# Patient Record
Sex: MALE | Race: WHITE | NOT HISPANIC OR LATINO | Employment: STUDENT | RURAL
[De-identification: names, ages, dates, MRNs, and addresses within clinical notes are randomized per-mention and may not be internally consistent; named-entity substitution may affect disease eponyms.]

---
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
## 2020-01-21 DIAGNOSIS — F80.2 RECEPTIVE EXPRESSIVE LANGUAGE DISORDER: ICD-10-CM

## 2020-01-21 DIAGNOSIS — F31.9 BIPOLAR AND RELATED DISORDER (HCC): ICD-10-CM

## 2020-01-21 DIAGNOSIS — F84.0 AUTISM SPECTRUM DISORDER: ICD-10-CM

## 2020-01-21 DIAGNOSIS — F51.05 INSOMNIA DUE TO MENTAL DISORDER: ICD-10-CM

## 2020-01-21 RX ORDER — QUETIAPINE FUMARATE 100 MG/1
100 TABLET, FILM COATED ORAL 3 TIMES DAILY
Qty: 90 TABLET | Refills: 2 | Status: SHIPPED | OUTPATIENT
Start: 2020-01-21 | End: 2020-03-24 | Stop reason: SDUPTHER

## 2020-01-21 RX ORDER — QUETIAPINE FUMARATE 300 MG/1
300 TABLET, FILM COATED ORAL NIGHTLY
Qty: 30 TABLET | Refills: 2 | Status: SHIPPED | OUTPATIENT
Start: 2020-01-21 | End: 2020-03-24 | Stop reason: SDUPTHER

## 2020-01-21 RX ORDER — CLONIDINE HYDROCHLORIDE 0.2 MG/1
TABLET ORAL
Qty: 60 TABLET | Refills: 2 | Status: SHIPPED | OUTPATIENT
Start: 2020-01-21 | End: 2020-03-24 | Stop reason: SDUPTHER

## 2020-01-21 RX ORDER — BENZTROPINE MESYLATE 1 MG/1
0.5 TABLET ORAL 2 TIMES DAILY
Qty: 30 TABLET | Refills: 2 | Status: SHIPPED | OUTPATIENT
Start: 2020-01-21 | End: 2020-03-24 | Stop reason: SDUPTHER

## 2020-03-24 ENCOUNTER — TELEMEDICINE (OUTPATIENT)
Dept: PSYCHIATRY | Facility: CLINIC | Age: 20
End: 2020-03-24

## 2020-03-24 VITALS
HEART RATE: 119 BPM | TEMPERATURE: 99.3 F | SYSTOLIC BLOOD PRESSURE: 133 MMHG | BODY MASS INDEX: 24.16 KG/M2 | WEIGHT: 145 LBS | OXYGEN SATURATION: 97 % | DIASTOLIC BLOOD PRESSURE: 91 MMHG | HEIGHT: 65 IN

## 2020-03-24 DIAGNOSIS — F31.9 BIPOLAR AND RELATED DISORDER (HCC): ICD-10-CM

## 2020-03-24 DIAGNOSIS — F51.05 INSOMNIA DUE TO MENTAL DISORDER: ICD-10-CM

## 2020-03-24 DIAGNOSIS — F80.2 RECEPTIVE EXPRESSIVE LANGUAGE DISORDER: ICD-10-CM

## 2020-03-24 DIAGNOSIS — F84.0 AUTISM SPECTRUM DISORDER: Primary | ICD-10-CM

## 2020-03-24 DIAGNOSIS — F41.9 ANXIETY DISORDER, UNSPECIFIED TYPE: ICD-10-CM

## 2020-03-24 PROCEDURE — 90792 PSYCH DIAG EVAL W/MED SRVCS: CPT | Performed by: NURSE PRACTITIONER

## 2020-03-24 RX ORDER — QUETIAPINE FUMARATE 300 MG/1
300 TABLET, FILM COATED ORAL NIGHTLY
Qty: 30 TABLET | Refills: 1 | Status: SHIPPED | OUTPATIENT
Start: 2020-03-24 | End: 2020-04-28 | Stop reason: SDUPTHER

## 2020-03-24 RX ORDER — QUETIAPINE FUMARATE 100 MG/1
TABLET, FILM COATED ORAL
Qty: 90 TABLET | Refills: 1 | Status: SHIPPED | OUTPATIENT
Start: 2020-03-24 | End: 2020-04-28 | Stop reason: SDUPTHER

## 2020-03-24 RX ORDER — HYDROXYZINE HYDROCHLORIDE 25 MG/1
25 TABLET, FILM COATED ORAL
Qty: 30 TABLET | Refills: 1 | Status: SHIPPED | OUTPATIENT
Start: 2020-03-24 | End: 2020-04-28 | Stop reason: SDUPTHER

## 2020-03-24 RX ORDER — CLONIDINE HYDROCHLORIDE 0.2 MG/1
TABLET ORAL
Qty: 60 TABLET | Refills: 1 | Status: SHIPPED | OUTPATIENT
Start: 2020-03-24 | End: 2020-04-28 | Stop reason: SDUPTHER

## 2020-03-24 RX ORDER — BENZTROPINE MESYLATE 1 MG/1
0.5 TABLET ORAL 2 TIMES DAILY
Qty: 30 TABLET | Refills: 1 | Status: SHIPPED | OUTPATIENT
Start: 2020-03-24 | End: 2020-04-28 | Stop reason: SDUPTHER

## 2020-03-24 NOTE — PROGRESS NOTES
"This provider is located at [facility], [address] [facility], [place], [address] using [employer].  The patient is seen remotely at [facility], [address], [place] [address] via [employer], an encrypted service provided through [facility] with staff present.  The patient's condition being diagnosed/treated is appropriate for telemedicine. The provider identified herself as well as her credentials.  The patient and/or patient's guardian consent to be seen remotely, and when consent is given they understand that the consent allows for patient identifiable information to be sent to a third party as needed.   They may refuse to be seen remotely at any time. The electronic data is encrypted and password protected, and the patient has been advised of the potential risks to privacy notwithstanding such measures.      Char Butler is a 19 y.o. male who presents today for medication management and initial encounter with this APRN    Chief Complaint:  Bipolar disorder, autism, impulse-control disorder, disruptive behavior, mixed receptive expressive language disorder    History of Present Illness:  Accompanied by: The patient was being seen with a therapist (Kary) and a nurse (Cindy) due to the patient's age, condition, and to assist with sharing of information and facilitation of visit.  The patient has known language disorder and is unable to answer any questions.    This is the first encounter for this APRN with this patient.  The patient does repeat words and sounds during the visit.  The patient's nurse and therapist report that the patient has been very hyperactive, they state that this is his normal.  The therapist reports that they recently found out that the patient was \"cheeking\" some of his medications.  Therapist reports that they now have to watch him swallow his medications to make sure that he does not cheek them, she states that they do not feel this was intentional " or happened more than a few times.  The therapist nurse reports that the patient has had some behavioral outbursts, they state it is hard to get him to sit still as he likes to be very active.  The patient's nurse and therapist report that the patient has been more physically aggressive and that he has been grabbing people.  They do not think that he has been doing this in order to hurt anybody, they state that he would just grab them and not let go.  They report that he likes to give hugs.  They report the patient's appetite is good, although they report that it takes him a long time to eat, especially at supper.  They report that at suppertime he will sit for an hour and the staff have to either feed him, or encourage him to take every bite by telling him to take a bite each time, and that does seem to help.  They report that the patient has trouble falling asleep as well as staying asleep, they estimate that he averages about 3 hours of sleep per night and would like to consider adding something to his medications to help with sleep.  They deny any symptoms or signs of nightmares.  They deny the patient acting depressed, but he does appear anxious at times they state.  He does appear to be very hyperactive throughout most of the day they state.  The therapist and nurse deny any demonstrations of auditory or visual hallucinations.  The nurse and therapist deny any expressions of or signs of suicidal or homicidal ideations.  The therapist and nurse deny any abnormal muscle movements, tics, or side effects from his current medication regimen.  The therapist and nurse denied any new medical problems or changes in medications with the patient since his last visit with his facility.  They deny any fever, recent illness, recent travel outside the country, or any known exposure to the [place] at 19 virus.  The nurse and therapist would like to add something at night to help him sleep to the patient's medications at this  visit.        The following portions of the patient's history were reviewed and updated as appropriate: allergies, current medications, past family history, past medical history, past social history, past surgical history and problem list.      Past Medical History:  Past Medical History:   Diagnosis Date   • Autism spectrum disorder    • Bipolar disorder (CMS/HCC)    • Disruptive behavior disorder    • Impulse control disorder    • Mixed receptive-expressive language disorder    • Psychosis (CMS/HCC)        Social History:  Social History     Socioeconomic History   • Marital status: Single     Spouse name: Not on file   • Number of children: Not on file   • Years of education: Not on file   • Highest education level: Not on file   Tobacco Use   • Smoking status: Unknown If Ever Smoked   Substance and Sexual Activity   • Alcohol use: Not Currently   • Drug use: Not Currently   • Sexual activity: Defer       Family History:  Family History   Family history unknown: Yes       Past Surgical History:  History reviewed. No pertinent surgical history.    Problem List:  There is no problem list on file for this patient.      Allergy:   Allergies   Allergen Reactions   • Cinnamon Unknown (See Comments)     Taken from referral   • Fish-Derived Products Unknown (See Comments)     Taken from referral   • Nutmeg Oil (Myristica Oil) Unknown (See Comments)     Taken from referral   • Tomato Unknown - High Severity     Raw tomatoes        Current Medications:   Current Outpatient Medications   Medication Sig Dispense Refill   • benztropine (COGENTIN) 1 MG tablet Take 0.5 tablets by mouth 2 (Two) Times a Day. 30 tablet 1   • cloNIDine (CATAPRES) 0.2 MG tablet Take 0.5 tablets by mouth Daily With Breakfast & Lunch AND 1 tablet Every Night. 7AM, 3PM, 7PM 60 tablet 1   • hydrOXYzine (ATARAX) 25 MG tablet Take 1 tablet by mouth every night at bedtime. 30 tablet 1   • QUEtiapine (SEROquel) 100 MG tablet Take one pill three times daily  "at 7 am, 12 pm, and 4 pm 90 tablet 1   • QUEtiapine (SEROquel) 300 MG tablet Take 1 tablet by mouth Every Night. 30 tablet 1     No current facility-administered medications for this visit.        Review of Symptoms:    Review of Systems   Constitutional: Negative.    HENT: Negative.    Eyes: Negative.    Respiratory: Negative.    Cardiovascular: Negative.    Gastrointestinal: Negative.    Endocrine: Negative.    Genitourinary: Negative.    Musculoskeletal: Negative.    Skin: Negative.    Neurological: Negative.    Psychiatric/Behavioral: Positive for agitation, behavioral problems, decreased concentration, sleep disturbance, positive for hyperactivity and stress. Negative for dysphoric mood, hallucinations, self-injury, suicidal ideas and depressed mood. The patient is nervous/anxious.          Physical Exam:   Physical Exam   Constitutional: He appears well-nourished.   Neurological: He is alert.   Psychiatric: He is hyperactive. Cognition and memory are impaired. He expresses no homicidal and no suicidal ideation. He is noncommunicative.   Vitals reviewed.    Blood pressure 133/91, pulse 119, temperature 99.3 °F (37.4 °C), height 165.1 cm (65\"), weight 65.8 kg (145 lb), SpO2 97 %. Body mass index is 24.13 kg/m².      Previous Provider notes and available records reviewed by this APRN today.      Mental Status Exam:   Hygiene:   good  Cooperation:  Guarded  Eye Contact:  Poor  Psychomotor Behavior:  Restless  Affect:  Restricted  Mood: anxious  Hopelessness: Denies  Speech:  minimal to mute, he can repeat sounds and some words, no reported change from patient's baseline  Thought Process:  Unable to demonstrate  Thought Content:  Unable to demonstrate  Suicidal:  None demonstrated  Homicidal:  None demonstrated  Hallucinations:  Not demonstrated today   Delusion:  Unable to demonstrate   Orientation:  Unable to evaluate and he will look at you and respond with sounds when you say his name  Reliability:  " poor  Insight:  None  Judgement:  Impaired  Impulse Control:  Impaired  Physical/Medical Issues:  Chronic health issues, no acute physical or medical issues today       Lab Results:   No visits with results within 1 Month(s) from this visit.   Latest known visit with results is:   No results found for any previous visit.       Assessment/Plan   Problems Addressed this Visit     None      Visit Diagnoses     Autism spectrum disorder    -  Primary    Relevant Medications    benztropine (COGENTIN) 1 MG tablet    cloNIDine (CATAPRES) 0.2 MG tablet    QUEtiapine (SEROquel) 100 MG tablet    QUEtiapine (SEROquel) 300 MG tablet    hydrOXYzine (ATARAX) 25 MG tablet    Bipolar and related disorder (CMS/HCC)        Relevant Medications    benztropine (COGENTIN) 1 MG tablet    cloNIDine (CATAPRES) 0.2 MG tablet    QUEtiapine (SEROquel) 100 MG tablet    QUEtiapine (SEROquel) 300 MG tablet    hydrOXYzine (ATARAX) 25 MG tablet    Anxiety disorder, unspecified type        Relevant Medications    benztropine (COGENTIN) 1 MG tablet    cloNIDine (CATAPRES) 0.2 MG tablet    QUEtiapine (SEROquel) 100 MG tablet    QUEtiapine (SEROquel) 300 MG tablet    hydrOXYzine (ATARAX) 25 MG tablet    Insomnia due to mental disorder        Relevant Medications    benztropine (COGENTIN) 1 MG tablet    cloNIDine (CATAPRES) 0.2 MG tablet    QUEtiapine (SEROquel) 100 MG tablet    QUEtiapine (SEROquel) 300 MG tablet    hydrOXYzine (ATARAX) 25 MG tablet    Receptive expressive language disorder        Relevant Medications    benztropine (COGENTIN) 1 MG tablet    cloNIDine (CATAPRES) 0.2 MG tablet    QUEtiapine (SEROquel) 100 MG tablet    QUEtiapine (SEROquel) 300 MG tablet    hydrOXYzine (ATARAX) 25 MG tablet          Visit Diagnoses:    ICD-10-CM ICD-9-CM   1. Autism spectrum disorder F84.0 299.00   2. Bipolar and related disorder (CMS/HCC) F31.9 296.80   3. Anxiety disorder, unspecified type F41.9 300.00   4. Insomnia due to mental disorder F51.05 300.9      327.02   5. Receptive expressive language disorder F80.2 315.32         GOALS:  Short Term Goals: Patient will be compliant with medication, and patient will have no significant medication related side effects.  Patient will be engaged in psychotherapy as indicated.  Patient will report subjective improvement of symptoms.  Long term goals: To stabilize mood and treat/improve subjective symptoms, the patient will stay out of the hospital, the patient will be at an optimal level of functioning, and the patient will take all medications as prescribed.  The patient's nurse and therapist/guardians verbalized understanding and agreement with goals that were mutually set.      TREATMENT PLAN: Continue supportive psychotherapy efforts and medications as indicated.  Continue clonidine 0.1 mg by mouth twice daily for behaviors, take once in the morning and once at noon.  Continue clonidine 0.2 mg to take by mouth, for behaviors and sleep, once daily at bedtime.  Continue previously prescribed Cogentin 1 mg to take half a tablet, or a total of 0.5 mg, by mouth twice daily.  Continue Seroquel 100 mg to take by mouth 3 times daily, to take at 7 AM, 12 PM, and 4 PM for mooed and behaviors.  Continue Seroquel 300 mg by mouth once daily at bedtime for mood, behaviors, and sleep.  Add hydroxyzine 25 mg by mouth once daily at bedtime for insomnia/sleep and anxiety..  Pharmacological and Non-Pharmacological treatment options discussed during today's visit, including off label use of medications. Patient's caretakers/guardians acknowledged and verbally consented with current treatment plan and was educated on the importance of compliance with treatment and follow-up appointments.      Nursing staff to continue to monitor vital signs and will have patient see PCP if abnormal vital signs persist beyond 24 hours, change, worsen, or any concerns.      MEDICATION ISSUES:  Discussed medication options and treatment plan of prescribed  medication, any off label use of medication, as well as the risks, benefits, any black box warnings including increased suicidality, and side effects including but not limited to potential falls, dizziness, possible impaired driving, GI side effects (change in appetite, abdominal discomfort, nausea, vomiting, diarrhea, and/or constipation), dry mouth, somnolence, sedation, insomnia, activation, agitation, irritation, tremors, abnormal muscle movements or disorders, tardive dyskinesia, akathisia, asthenia, headache, sweating, possible bruising or rare bleeding, electrolyte and/or fluid abnormalities, change in blood pressure/heart rate/and or heart rhythm, hypotension, sexual dysfunction, rare impulse control problems, rare seizures, rare neuroleptic malignant syndrome, increased risk of death and cerebrovascular events, change in blood glucose and increased risk for diabetes, change in triglycerides and cholesterol and increased risk for dyslipidemia,  weight gain, weight gain that can become problematic to health, skin conditions and reactions, and metabolic adversities among others. Patient's caretakers/guardians are agreeable to call the office with any worsening of symptoms or onset of side effects, or if any concerns or questions arise.  The contact information for the office is made available to the patient's guardians/caretakers. Patient's caretakers/guardians are agreeable to call 911 or go to the nearest ER should they begin having any SI/HI, or if any urgent concerns arise. No medication side effects or related complaints today.       MEDS ORDERED DURING VISIT:  New Medications Ordered This Visit   Medications   • benztropine (COGENTIN) 1 MG tablet     Sig: Take 0.5 tablets by mouth 2 (Two) Times a Day.     Dispense:  30 tablet     Refill:  1   • cloNIDine (CATAPRES) 0.2 MG tablet     Sig: Take 0.5 tablets by mouth Daily With Breakfast & Lunch AND 1 tablet Every Night. 7AM, 3PM, 7PM     Dispense:  60  tablet     Refill:  1   • QUEtiapine (SEROquel) 100 MG tablet     Sig: Take one pill three times daily at 7 am, 12 pm, and 4 pm     Dispense:  90 tablet     Refill:  1   • QUEtiapine (SEROquel) 300 MG tablet     Sig: Take 1 tablet by mouth Every Night.     Dispense:  30 tablet     Refill:  1   • hydrOXYzine (ATARAX) 25 MG tablet     Sig: Take 1 tablet by mouth every night at bedtime.     Dispense:  30 tablet     Refill:  1       Return in about 4 weeks (around 4/21/2020), or if symptoms worsen or fail to improve, for Recheck.       Progress toward goal: Not at goal    Functional Status: Severe impairment    Prognosis: Guarded with Ongoing Treatment            This document has been electronically signed by JOSUE Agosto  March 24, 2020 18:46    Please note that portions of this note were completed with a voice recognition program. Efforts were made to edit dictation, but occasionally words are mistranscribed.

## 2020-04-28 ENCOUNTER — TELEMEDICINE (OUTPATIENT)
Dept: PSYCHIATRY | Facility: CLINIC | Age: 20
End: 2020-04-28

## 2020-04-28 VITALS
DIASTOLIC BLOOD PRESSURE: 69 MMHG | HEART RATE: 104 BPM | WEIGHT: 150 LBS | TEMPERATURE: 97.5 F | BODY MASS INDEX: 24.99 KG/M2 | OXYGEN SATURATION: 98 % | SYSTOLIC BLOOD PRESSURE: 101 MMHG | HEIGHT: 65 IN

## 2020-04-28 DIAGNOSIS — F84.0 AUTISM SPECTRUM DISORDER: Primary | ICD-10-CM

## 2020-04-28 DIAGNOSIS — F51.05 INSOMNIA DUE TO MENTAL DISORDER: ICD-10-CM

## 2020-04-28 DIAGNOSIS — F80.2 RECEPTIVE EXPRESSIVE LANGUAGE DISORDER: ICD-10-CM

## 2020-04-28 DIAGNOSIS — F41.9 ANXIETY DISORDER, UNSPECIFIED TYPE: ICD-10-CM

## 2020-04-28 DIAGNOSIS — F39 UNSPECIFIED MOOD (AFFECTIVE) DISORDER (HCC): ICD-10-CM

## 2020-04-28 PROCEDURE — 99214 OFFICE O/P EST MOD 30 MIN: CPT | Performed by: NURSE PRACTITIONER

## 2020-04-28 RX ORDER — BENZTROPINE MESYLATE 1 MG/1
0.5 TABLET ORAL 2 TIMES DAILY
Qty: 30 TABLET | Refills: 1 | Status: SHIPPED | OUTPATIENT
Start: 2020-04-28 | End: 2020-05-26 | Stop reason: SDUPTHER

## 2020-04-28 RX ORDER — QUETIAPINE FUMARATE 300 MG/1
300 TABLET, FILM COATED ORAL NIGHTLY
Qty: 30 TABLET | Refills: 1 | Status: SHIPPED | OUTPATIENT
Start: 2020-04-28 | End: 2020-05-26 | Stop reason: SDUPTHER

## 2020-04-28 RX ORDER — CLONIDINE HYDROCHLORIDE 0.2 MG/1
TABLET ORAL
Qty: 60 TABLET | Refills: 1 | Status: SHIPPED | OUTPATIENT
Start: 2020-04-28 | End: 2020-05-26 | Stop reason: SDUPTHER

## 2020-04-28 RX ORDER — QUETIAPINE FUMARATE 100 MG/1
TABLET, FILM COATED ORAL
Qty: 90 TABLET | Refills: 1 | Status: SHIPPED | OUTPATIENT
Start: 2020-04-28 | End: 2020-05-26 | Stop reason: SDUPTHER

## 2020-04-28 RX ORDER — HYDROXYZINE HYDROCHLORIDE 25 MG/1
25 TABLET, FILM COATED ORAL
Qty: 30 TABLET | Refills: 1 | Status: SHIPPED | OUTPATIENT
Start: 2020-04-28 | End: 2020-05-26 | Stop reason: SDUPTHER

## 2020-04-28 NOTE — TREATMENT PLAN
Multi-Disciplinary Problems (from Behavioral Health Treatment Plan)    Active Problems     Problem: Mood Instability  Start Date: 04/28/20    Problem Details:  The patient is unable to self-scale this problem on a 0-10 scale with 10 being the worst.       Goal Priority Start Date Expected End Date End Date    Patient will achieve mood stability as evidenced by controlled behavior and a more deliberate thought process -- 04/28/20 -- --    Goal Details:  Progress toward goal:  Not appropriate to rate progress toward goal since this is the initial treatment plan.       Goal Intervention Frequency Start Date End Date    Provide structure and focus to patient's thoughts and actions by establishing plans and routine. Weekly 04/28/20 --    Intervention Details:  Duration of treatment until until remission of symptoms.       Goal Intervention Frequency Start Date End Date    Assist patient in setting responsible goals and limits in behavior. Weekly 04/28/20 --    Intervention Details:  Duration of treatment until until remission of symptoms.       Prob Intervention Frequency Start Date End Date    Manage Emotion, Temperament and Mood PRN -- --                         I have discussed and reviewed this treatment plan with the patient and/or guardian.  The patient/guardian has verbally agreed with this treatment plan (no signatures are obtained at today's visit as the patient is a telehealth patient and is unable to print and sign this document, therefore verbal agreement is obtained).

## 2020-04-28 NOTE — PROGRESS NOTES
This provider is located at [facility], [address] [place] [address], [place], [address] using [employer].  The patient is seen remotely at [facility], [address], [place] [address] via [employer], an encrypted service provided through [facility] with staff present.  The patient's condition being diagnosed/treated is appropriate for telemedicine. The provider identified herself as well as her credentials.  The patient and/or patient's guardian consent to be seen remotely, and when consent is given they understand that the consent allows for patient identifiable information to be sent to a third party as needed.   They may refuse to be seen remotely at any time. The electronic data is encrypted and password protected, and the patient has been advised of the potential risks to privacy notwithstanding such measures.      Char Butler is a 19 y.o. male who presents today for follow up    Chief Complaint:  Bipolar disorder, autism, impulse-control disorder, disruptive behavior, mixed receptive expressive language disorder    History of Present Illness:  Accompanied by: The patient was being seen with a therapist (Kary) and a nurse (Cindy) due to the patient's age, condition, and to assist with sharing of information and facilitation of visit.  The patient has known language disorder and is unable to answer any questions.  The guardians/caretakers describe the patient's mood and behaviors as okay over the last few weeks.  His has not had as many behavioral outbursts.  The addition of hydroxyzine at bedtime at last visit seemed to help with his sleep, although they report that since his regular schedule and classroom setting has changed due to the covid 19 pandemic, he has gotten his days and nights turned around and tends to stay up late at night and sleep in during the day.  His sleep is improved though.  The guardians report the patient's appetite as good.  The guardians deny any  nightmares, or signs of nightmares, for the patient.  The guardians deny any new medical problems or changes in medications since last appointment with this facility.  The guardians report compliance with the patient's current medication regimen.  The guardians deny any current side effects from the patient's current medication regimen.  The guardians deny any noticeable abnormal muscle movements or tics.  The patient is unable to rate any symptoms of depression or anxiety on a 0-10 scale, with 10 being the worst.  The guardians report that the patient is currently not in a traditional classroom setting at their facility due to social distancing and quarantining recommendations from the Government with trying to decrease the spread/exposure to the Covid 19 virus pandemic.  The guardians would like to not adjust or change the patient's medications at this visit.  The patient and guardian deny any auditory hallucinations or visual hallucinations, or signs of auditory or visual hallucinations.  The patient and guardian deny any suicidal ideations and homicidal ideations, or signs of suicidal or homicidal ideations, and is convincing.      The following portions of the patient's history were reviewed and updated as appropriate: allergies, current medications, past family history, past medical history, past social history, past surgical history and problem list.      Past Medical History:  Past Medical History:   Diagnosis Date   • Autism spectrum disorder    • Bipolar disorder (CMS/HCC)    • Disruptive behavior disorder    • Impulse control disorder    • Mixed receptive-expressive language disorder    • Psychosis (CMS/HCC)        Social History:  Social History     Socioeconomic History   • Marital status: Single     Spouse name: Not on file   • Number of children: Not on file   • Years of education: Not on file   • Highest education level: Not on file   Tobacco Use   • Smoking status: Unknown If Ever Smoked   Substance  and Sexual Activity   • Alcohol use: Not Currently   • Drug use: Not Currently   • Sexual activity: Defer       Family History:  Family History   Family history unknown: Yes       Past Surgical History:  History reviewed. No pertinent surgical history.    Problem List:  There is no problem list on file for this patient.      Allergy:   Allergies   Allergen Reactions   • Cinnamon Unknown (See Comments)     Taken from referral   • Fish-Derived Products Unknown (See Comments)     Taken from referral   • Nutmeg Oil (Myristica Oil) Unknown (See Comments)     Taken from referral   • Tomato Unknown - High Severity     Raw tomatoes        Current Medications:   Current Outpatient Medications   Medication Sig Dispense Refill   • benztropine (COGENTIN) 1 MG tablet Take 0.5 tablets by mouth 2 (Two) Times a Day. 30 tablet 1   • cloNIDine (CATAPRES) 0.2 MG tablet Take 0.5 tablets by mouth Daily With Breakfast & Lunch AND 1 tablet Every Night. 60 tablet 1   • hydrOXYzine (ATARAX) 25 MG tablet Take 1 tablet by mouth every night at bedtime. 30 tablet 1   • QUEtiapine (SEROquel) 100 MG tablet Take one pill three times daily at 7 am, 12 pm, and 4 pm 90 tablet 1   • QUEtiapine (SEROquel) 300 MG tablet Take 1 tablet by mouth Every Night. 30 tablet 1     No current facility-administered medications for this visit.        Review of Symptoms:    Review of Systems   Constitutional: Negative.    HENT: Negative.    Eyes: Negative.    Respiratory: Negative.    Cardiovascular: Negative.    Gastrointestinal: Negative.    Endocrine: Negative.    Genitourinary: Negative.    Musculoskeletal: Negative.    Skin: Negative.    Neurological: Negative.    Psychiatric/Behavioral: Positive for agitation, behavioral problems, decreased concentration, sleep disturbance, positive for hyperactivity and stress. Negative for dysphoric mood, hallucinations, self-injury, suicidal ideas and depressed mood. The patient is nervous/anxious.          Physical Exam:  "  Physical Exam   Constitutional: He appears well-nourished.   Neurological: He is alert.   Psychiatric: He is hyperactive. Cognition and memory are impaired. He expresses no homicidal and no suicidal ideation. He is noncommunicative.   Vitals reviewed.        Blood pressure 101/69, pulse 104, temperature 97.5 °F (36.4 °C), height 165.1 cm (65\"), weight 68 kg (150 lb), SpO2 98 %. Body mass index is 24.96 kg/m².        Mental Status Exam:   Hygiene:   good  Cooperation:  Guarded  Eye Contact:  Poor  Psychomotor Behavior:  Restless  Affect:  Restricted  Mood: anxious  Hopelessness: Denies  Speech:  minimal, he can repeat sounds and some words, no reported change from patient's baseline  Thought Process:  Unable to demonstrate  Thought Content:  Unable to demonstrate  Suicidal:  None demonstrated  Homicidal:  None demonstrated  Hallucinations:  Not demonstrated today   Delusion:  Unable to demonstrate   Orientation:  Unable to evaluate and he will look at you and respond with sounds when you say his name  Reliability:  poor  Insight:  None  Judgement:  Impaired  Impulse Control:  Impaired  Physical/Medical Issues:  Chronic health issues, no acute physical or medical issues today       Lab Results:   No visits with results within 1 Month(s) from this visit.   Latest known visit with results is:   No results found for any previous visit.       Assessment/Plan   Problems Addressed this Visit     None      Visit Diagnoses     Autism spectrum disorder    -  Primary    Relevant Medications    benztropine (COGENTIN) 1 MG tablet    cloNIDine (CATAPRES) 0.2 MG tablet    hydrOXYzine (ATARAX) 25 MG tablet    QUEtiapine (SEROquel) 100 MG tablet    QUEtiapine (SEROquel) 300 MG tablet    Unspecified mood (affective) disorder (CMS/HCC)        Relevant Medications    benztropine (COGENTIN) 1 MG tablet    cloNIDine (CATAPRES) 0.2 MG tablet    hydrOXYzine (ATARAX) 25 MG tablet    QUEtiapine (SEROquel) 100 MG tablet    QUEtiapine " (SEROquel) 300 MG tablet    Anxiety disorder, unspecified type        Relevant Medications    benztropine (COGENTIN) 1 MG tablet    cloNIDine (CATAPRES) 0.2 MG tablet    hydrOXYzine (ATARAX) 25 MG tablet    QUEtiapine (SEROquel) 100 MG tablet    QUEtiapine (SEROquel) 300 MG tablet    Receptive expressive language disorder        Insomnia due to mental disorder        Relevant Medications    benztropine (COGENTIN) 1 MG tablet    cloNIDine (CATAPRES) 0.2 MG tablet    hydrOXYzine (ATARAX) 25 MG tablet    QUEtiapine (SEROquel) 100 MG tablet    QUEtiapine (SEROquel) 300 MG tablet          Visit Diagnoses:    ICD-10-CM ICD-9-CM   1. Autism spectrum disorder F84.0 299.00   2. Unspecified mood (affective) disorder (CMS/[facility]) F39 296.90   3. Anxiety disorder, unspecified type F41.9 300.00   4. Receptive expressive language disorder F80.2 315.32   5. Insomnia due to mental disorder F51.05 300.9     327.02         GOALS:  Short Term Goals: Patient will be compliant with medication, and patient will have no significant medication related side effects.  Patient will be engaged in psychotherapy as indicated.  Patient will report subjective improvement of symptoms.  Long term goals: To stabilize mood and treat/improve subjective symptoms, the patient will stay out of the hospital, the patient will be at an optimal level of functioning, and the patient will take all medications as prescribed.  The patient's nurse and therapist/guardians verbalized understanding and agreement with goals that were mutually set.      TREATMENT PLAN: Continue supportive psychotherapy efforts and medications as indicated.  Continue clonidine 0.1 mg by mouth twice daily for behaviors, take once in the morning and once at noon.  Continue clonidine 0.2 mg to take by mouth, for behaviors and sleep, once daily at bedtime.  Continue previously prescribed Cogentin 1 mg to take half a tablet, or a total of 0.5 mg, by mouth twice daily.  Continue Seroquel 100 mg  to take by mouth 3 times daily, to take at 7 AM, 12 PM, and 4 PM for mood and behaviors.  Continue Seroquel 300 mg by mouth once daily at bedtime for mood, behaviors, and sleep.  Continue hydroxyzine 25 mg by mouth once daily at bedtime for insomnia/sleep and anxiety.  Pharmacological and Non-Pharmacological treatment options discussed during today's visit, including off label use of medications. Patient's caretakers/guardians acknowledged and verbally consented with current treatment plan and was educated on the importance of compliance with treatment and follow-up appointments.        This office has requested the patient's most recent labs, if no recent labs for review we have discussed ordering labs as necessary.      MEDICATION ISSUES:  Discussed medication options and treatment plan of prescribed medication, any off label use of medication, as well as the risks, benefits, any black box warnings including increased suicidality, and side effects including but not limited to potential falls, dizziness, possible impaired driving, GI side effects (change in appetite, abdominal discomfort, nausea, vomiting, diarrhea, and/or constipation), dry mouth, somnolence, sedation, insomnia, activation, agitation, irritation, tremors, abnormal muscle movements or disorders, tardive dyskinesia, akathisia, asthenia, headache, sweating, possible bruising or rare bleeding, electrolyte and/or fluid abnormalities, change in blood pressure/heart rate/and or heart rhythm, hypotension, sexual dysfunction, rare impulse control problems, rare seizures, rare neuroleptic malignant syndrome, increased risk of death and cerebrovascular events, change in blood glucose and increased risk for diabetes, change in triglycerides and cholesterol and increased risk for dyslipidemia,  weight gain, weight gain that can become problematic to health, skin conditions and reactions, and metabolic adversities among others. Patient's caretakers/guardians  are agreeable to call the office with any worsening of symptoms or onset of side effects, or if any concerns or questions arise.  The contact information for the office is made available to the patient's guardians/caretakers. Patient's caretakers/guardians are agreeable to call 911 or go to the nearest ER should they begin having any SI/HI, or if any urgent concerns arise. No medication side effects or related complaints today.         MEDS ORDERED DURING VISIT:  New Medications Ordered This Visit   Medications   • benztropine (COGENTIN) 1 MG tablet     Sig: Take 0.5 tablets by mouth 2 (Two) Times a Day.     Dispense:  30 tablet     Refill:  1   • cloNIDine (CATAPRES) 0.2 MG tablet     Sig: Take 0.5 tablets by mouth Daily With Breakfast & Lunch AND 1 tablet Every Night.     Dispense:  60 tablet     Refill:  1   • hydrOXYzine (ATARAX) 25 MG tablet     Sig: Take 1 tablet by mouth every night at bedtime.     Dispense:  30 tablet     Refill:  1   • QUEtiapine (SEROquel) 100 MG tablet     Sig: Take one pill three times daily at 7 am, 12 pm, and 4 pm     Dispense:  90 tablet     Refill:  1   • QUEtiapine (SEROquel) 300 MG tablet     Sig: Take 1 tablet by mouth Every Night.     Dispense:  30 tablet     Refill:  1       Return in about 4 weeks (around 5/26/2020), or if symptoms worsen or fail to improve, for Recheck.       Progress toward goal: Not at goal    Functional Status: Severe impairment    Prognosis: Guarded with Ongoing Treatment     Treatment plan completed: 4/28/20.            This document has been electronically signed by JOSUE Agosto  April 28, 2020 12:34    Please note that portions of this note were completed with a voice recognition program. Efforts were made to edit dictation, but occasionally words are mistranscribed.

## 2020-05-26 ENCOUNTER — TELEMEDICINE (OUTPATIENT)
Dept: PSYCHIATRY | Facility: CLINIC | Age: 20
End: 2020-05-26

## 2020-05-26 VITALS — OXYGEN SATURATION: 96 % | BODY MASS INDEX: 24.83 KG/M2 | HEIGHT: 65 IN | WEIGHT: 149 LBS

## 2020-05-26 DIAGNOSIS — F39 UNSPECIFIED MOOD (AFFECTIVE) DISORDER (HCC): ICD-10-CM

## 2020-05-26 DIAGNOSIS — F51.05 INSOMNIA DUE TO MENTAL DISORDER: ICD-10-CM

## 2020-05-26 DIAGNOSIS — F41.9 ANXIETY DISORDER, UNSPECIFIED TYPE: ICD-10-CM

## 2020-05-26 DIAGNOSIS — F84.0 AUTISM SPECTRUM DISORDER: Primary | ICD-10-CM

## 2020-05-26 DIAGNOSIS — Z79.899 HIGH RISK MEDICATION USE: ICD-10-CM

## 2020-05-26 DIAGNOSIS — F80.2 RECEPTIVE EXPRESSIVE LANGUAGE DISORDER: ICD-10-CM

## 2020-05-26 PROCEDURE — 99214 OFFICE O/P EST MOD 30 MIN: CPT | Performed by: NURSE PRACTITIONER

## 2020-05-26 RX ORDER — CLONIDINE HYDROCHLORIDE 0.2 MG/1
TABLET ORAL
Qty: 60 TABLET | Refills: 1 | Status: SHIPPED | OUTPATIENT
Start: 2020-05-26 | End: 2020-06-30 | Stop reason: SDUPTHER

## 2020-05-26 RX ORDER — HYDROXYZINE HYDROCHLORIDE 25 MG/1
25 TABLET, FILM COATED ORAL
Qty: 30 TABLET | Refills: 1 | Status: SHIPPED | OUTPATIENT
Start: 2020-05-26 | End: 2020-06-30 | Stop reason: SDUPTHER

## 2020-05-26 RX ORDER — BENZTROPINE MESYLATE 1 MG/1
0.5 TABLET ORAL 2 TIMES DAILY
Qty: 30 TABLET | Refills: 1 | Status: SHIPPED | OUTPATIENT
Start: 2020-05-26 | End: 2020-06-30 | Stop reason: SDUPTHER

## 2020-05-26 RX ORDER — QUETIAPINE FUMARATE 100 MG/1
TABLET, FILM COATED ORAL
Qty: 90 TABLET | Refills: 1 | Status: SHIPPED | OUTPATIENT
Start: 2020-05-26 | End: 2020-06-30 | Stop reason: SDUPTHER

## 2020-05-26 RX ORDER — QUETIAPINE FUMARATE 300 MG/1
300 TABLET, FILM COATED ORAL NIGHTLY
Qty: 30 TABLET | Refills: 1 | Status: SHIPPED | OUTPATIENT
Start: 2020-05-26 | End: 2020-06-30 | Stop reason: SDUPTHER

## 2020-05-26 NOTE — PROGRESS NOTES
This provider is located at [facility], [address] [place] [address], [place], [address] using [email or web address].  The patient is seen remotely at [facility], [address], [place] [address] via [email or web address], an encrypted service provided through [facility] with staff present.  The patient's condition being diagnosed/treated is appropriate for telemedicine. The provider identified herself as well as her credentials.  The patient and/or patient's guardian consent to be seen remotely, and when consent is given they understand that the consent allows for patient identifiable information to be sent to a third party as needed.   They may refuse to be seen remotely at any time. The electronic data is encrypted and password protected, and the patient has been advised of the potential risks to privacy notwithstanding such measures.      Char Butler is a 19 y.o. male who presents today for follow up    Chief Complaint:  Bipolar disorder, autism, impulse-control disorder, disruptive behavior, mixed receptive expressive language disorder    History of Present Illness:  Accompanied by: The patient was being seen with a therapist (Kary) and a nurse (Cindy) due to the patient's age, condition, and to assist with sharing of information and facilitation of visit.  The guardians/caretakers describe the patient's mood and behaviors as pretty good over the last few weeks.  The guardians report the patient's appetite as good.  The guardians report the patient's sleep as improved and good, he is now sleeping through the night.  The guardians deny any nightmares, or signs of nightmares, for the patient.  The guardians deny any new medical problems or changes in medications since last appointment with this facility.  The guardians report compliance with the patient's current medication regimen.  The guardians deny any current side effects from the patient's current medication regimen.  The guardians deny any  "noticeable abnormal muscle movements or tics.  The guardians would like to not adjust or change the patient's medications at this visit.  The patient and guardian deny any auditory hallucinations or visual hallucinations, or signs of auditory or visual hallucinations.  The patient and guardian deny any suicidal ideations and homicidal ideations, or signs of suicidal or homicidal ideations, and is convincing.  The patient is mostly nonverbal, but can answer/say \"yes\" and \"no\".  The staff also report the patient will sometimes sing the ABC song, he will say \"boy boy boy\" a lot, he will say \"ho ho\", he will say \"big scary monster\" sometimes, and he can spell his name and some simple words.  They repot he has a history of an expressive language disorder so he has a lot of difficulty with communication at times.  They report his baseline/usual is to be hyperactive.  They report he likes to run and jump a lot, he likes to jump on his bed, and he likes to lay in the bed sometimes in the day.  The therapist reports he has a DVD player and some movies, but she isn't sure if he uses the DVD player or not.  The therapist reports there is a new client/peer at the facility who seems to irritate Shaun, and sometimes he gets agitated if he is around the new client very much do to this, but otherwise he seems to be doing pretty good - per his usual/baseline.      The following portions of the patient's history were reviewed and updated as appropriate: allergies, current medications, past family history, past medical history, past social history, past surgical history and problem list.      Past Medical History:  Past Medical History:   Diagnosis Date   • Autism spectrum disorder    • Bipolar disorder (CMS/HCC)    • Disruptive behavior disorder    • Impulse control disorder    • Mixed receptive-expressive language disorder    • Psychosis (CMS/HCC)        Social History:  Social History     Socioeconomic History   • Marital status: " Single     Spouse name: Not on file   • Number of children: Not on file   • Years of education: Not on file   • Highest education level: Not on file   Tobacco Use   • Smoking status: Unknown If Ever Smoked   Substance and Sexual Activity   • Alcohol use: Not Currently   • Drug use: Not Currently   • Sexual activity: Defer       Family History:  Family History   Family history unknown: Yes       Past Surgical History:  History reviewed. No pertinent surgical history.    Problem List:  There is no problem list on file for this patient.      Allergy:   Allergies   Allergen Reactions   • Cinnamon Unknown (See Comments)     Taken from referral   • Fish-Derived Products Unknown (See Comments)     Taken from referral   • Nutmeg Oil (Myristica Oil) Unknown (See Comments)     Taken from referral   • Tomato Unknown - High Severity     Raw tomatoes        Current Medications:   Current Outpatient Medications   Medication Sig Dispense Refill   • benztropine (COGENTIN) 1 MG tablet Take 0.5 tablets by mouth 2 (Two) Times a Day. 30 tablet 1   • cloNIDine (CATAPRES) 0.2 MG tablet Take 0.5 tablets by mouth Daily With Breakfast & Lunch AND 1 tablet Every Night. 60 tablet 1   • hydrOXYzine (ATARAX) 25 MG tablet Take 1 tablet by mouth every night at bedtime. 30 tablet 1   • QUEtiapine (SEROquel) 100 MG tablet Take one pill three times daily at 7 am, 12 pm, and 4 pm 90 tablet 1   • QUEtiapine (SEROquel) 300 MG tablet Take 1 tablet by mouth Every Night. 30 tablet 1     No current facility-administered medications for this visit.        Review of Symptoms:    Review of Systems   Constitutional: Negative.    HENT: Negative.    Eyes: Negative.    Respiratory: Negative.    Cardiovascular: Negative.    Gastrointestinal: Negative.    Endocrine: Negative.    Genitourinary: Negative.    Musculoskeletal: Negative.    Skin: Negative.    Neurological: Negative.    Psychiatric/Behavioral: Positive for agitation, behavioral problems, decreased  "concentration, sleep disturbance, positive for hyperactivity and stress. Negative for dysphoric mood, hallucinations, self-injury, suicidal ideas and depressed mood. The patient is nervous/anxious.          Physical Exam:   Physical Exam   Constitutional: He appears well-nourished.   Neurological: He is alert.   Psychiatric: He is hyperactive. Cognition and memory are impaired. He expresses no homicidal and no suicidal ideation. He is noncommunicative.   Vitals reviewed.        Height 165.1 cm (65\"), weight 67.6 kg (149 lb), SpO2 96 %. Body mass index is 24.79 kg/m².  Nurse unable to obtain blood pressure or heart rate at time of visit due to equipment would not take these vitals due to patient's movement during vitals being checked.      Mental Status Exam:   Hygiene:   good  Cooperation:  Guarded  Eye Contact:  Poor  Psychomotor Behavior:  Restless  Affect:  Restricted  Mood: anxious  Hopelessness: Denies  Speech:  minimal, he can repeat sounds and some words, no reported change from patient's baseline  Thought Process:  Unable to demonstrate  Thought Content:  Unable to demonstrate  Suicidal:  None demonstrated  Homicidal:  None demonstrated  Hallucinations:  Not demonstrated today   Delusion:  Unable to demonstrate   Orientation:  Unable to evaluate and he will look at you and respond with sounds when you say his name  Reliability:  poor  Insight:  None  Judgement:  Impaired  Impulse Control:  Impaired  Physical/Medical Issues:  Chronic health issues, no acute physical or medical issues today       Lab Results:   No visits with results within 1 Month(s) from this visit.   Latest known visit with results is:   No results found for any previous visit.       Assessment/Plan   Problems Addressed this Visit     None      Visit Diagnoses     Autism spectrum disorder    -  Primary    Relevant Medications    benztropine (COGENTIN) 1 MG tablet    cloNIDine (CATAPRES) 0.2 MG tablet    hydrOXYzine (ATARAX) 25 MG tablet    " QUEtiapine (SEROquel) 100 MG tablet    QUEtiapine (SEROquel) 300 MG tablet    Unspecified mood (affective) disorder (CMS/HCC)        Relevant Medications    benztropine (COGENTIN) 1 MG tablet    cloNIDine (CATAPRES) 0.2 MG tablet    hydrOXYzine (ATARAX) 25 MG tablet    QUEtiapine (SEROquel) 100 MG tablet    QUEtiapine (SEROquel) 300 MG tablet    Anxiety disorder, unspecified type        Relevant Medications    benztropine (COGENTIN) 1 MG tablet    cloNIDine (CATAPRES) 0.2 MG tablet    hydrOXYzine (ATARAX) 25 MG tablet    QUEtiapine (SEROquel) 100 MG tablet    QUEtiapine (SEROquel) 300 MG tablet    Receptive expressive language disorder        Insomnia due to mental disorder        Relevant Medications    benztropine (COGENTIN) 1 MG tablet    cloNIDine (CATAPRES) 0.2 MG tablet    hydrOXYzine (ATARAX) 25 MG tablet    QUEtiapine (SEROquel) 100 MG tablet    QUEtiapine (SEROquel) 300 MG tablet    High risk medication use        Relevant Orders    CBC & Differential    Comprehensive Metabolic Panel    Lipid Panel    TSH    T4, free          Visit Diagnoses:    ICD-10-CM ICD-9-CM   1. Autism spectrum disorder F84.0 299.00   2. Unspecified mood (affective) disorder (CMS/HCC) F39 296.90   3. Anxiety disorder, unspecified type F41.9 300.00   4. Receptive expressive language disorder F80.2 315.32   5. Insomnia due to mental disorder F51.05 300.9     327.02   6. High risk medication use Z79.899 V58.69         GOALS:  Short Term Goals: Patient will be compliant with medication, and patient will have no significant medication related side effects.  Patient will be engaged in psychotherapy as indicated.  Patient will report subjective improvement of symptoms.  Long term goals: To stabilize mood and treat/improve subjective symptoms, the patient will stay out of the hospital, the patient will be at an optimal level of functioning, and the patient will take all medications as prescribed.  The patient's nurse and therapist/guardians  verbalized understanding and agreement with goals that were mutually set.      TREATMENT PLAN: Continue supportive psychotherapy efforts and medications as indicated.  Continue clonidine 0.1 mg by mouth twice daily for behaviors, take once in the morning and once at noon.  Continue clonidine 0.2 mg to take by mouth, for behaviors and sleep, once daily at bedtime.  Continue previously prescribed Cogentin 1 mg to take half a tablet, or a total of 0.5 mg, by mouth twice daily.  The nurse and therapist report the patient came to their facility on this medication and aren't sure if it is preventative or to treat medication side effects/complications.  They deny any abnormal musculoskeletal movements or symptoms of EPS or TD.  Continue Seroquel 100 mg to take by mouth 3 times daily, to take at 7 AM, 12 PM, and 4 PM for mood and behaviors.  Continue Seroquel 300 mg by mouth once daily at bedtime for mood, behaviors, and sleep.  Continue hydroxyzine 25 mg by mouth once daily at bedtime for insomnia/sleep and anxiety.  Pharmacological and Non-Pharmacological treatment options discussed during today's visit, including off label use of medications. Patient's caretakers/guardians acknowledged and verbally consented with current treatment plan and was educated on the importance of compliance with treatment and follow-up appointments.        Labs ordered at today's visit: CBC with differential, CMP, Lipid Panel, TSH, and Free T4.      MEDICATION ISSUES:  Discussed medication options and treatment plan of prescribed medication, any off label use of medication, as well as the risks, benefits, any black box warnings including increased suicidality, and side effects including but not limited to potential falls, dizziness, possible impaired driving, GI side effects (change in appetite, abdominal discomfort, nausea, vomiting, diarrhea, and/or constipation), dry mouth, somnolence, sedation, insomnia, activation, agitation, irritation,  tremors, abnormal muscle movements or disorders, tardive dyskinesia, akathisia, asthenia, headache, sweating, possible bruising or rare bleeding, electrolyte and/or fluid abnormalities, change in blood pressure/heart rate/and or heart rhythm, hypotension, sexual dysfunction, rare impulse control problems, rare seizures, rare neuroleptic malignant syndrome, increased risk of death and cerebrovascular events, change in blood glucose and increased risk for diabetes, change in triglycerides and cholesterol and increased risk for dyslipidemia,  weight gain, weight gain that can become problematic to health, skin conditions and reactions, and metabolic adversities among others. Patient's caretakers/guardians are agreeable to call the office with any worsening of symptoms or onset of side effects, or if any concerns or questions arise.  The contact information for the office is made available to the patient's guardians/caretakers. Patient's caretakers/guardians are agreeable to call 911 or go to the nearest ER should they begin having any SI/HI, or if any urgent concerns arise. No medication side effects or related complaints today.         MEDS ORDERED DURING VISIT:  New Medications Ordered This Visit   Medications   • benztropine (COGENTIN) 1 MG tablet     Sig: Take 0.5 tablets by mouth 2 (Two) Times a Day.     Dispense:  30 tablet     Refill:  1   • cloNIDine (CATAPRES) 0.2 MG tablet     Sig: Take 0.5 tablets by mouth Daily With Breakfast & Lunch AND 1 tablet Every Night.     Dispense:  60 tablet     Refill:  1   • hydrOXYzine (ATARAX) 25 MG tablet     Sig: Take 1 tablet by mouth every night at bedtime.     Dispense:  30 tablet     Refill:  1   • QUEtiapine (SEROquel) 100 MG tablet     Sig: Take one pill three times daily at 7 am, 12 pm, and 4 pm     Dispense:  90 tablet     Refill:  1   • QUEtiapine (SEROquel) 300 MG tablet     Sig: Take 1 tablet by mouth Every Night.     Dispense:  30 tablet     Refill:  1        Return in about 4 weeks (around 6/23/2020), or if symptoms worsen or fail to improve, for Recheck.       Progress toward goal: Not at goal    Functional Status: Severe impairment    Prognosis: Guarded with Ongoing Treatment     Treatment plan completed: 4/28/20.            This document has been electronically signed by JOSUE Agosto  May 26, 2020 18:35    Please note that portions of this note were completed with a voice recognition program. Efforts were made to edit dictation, but occasionally words are mistranscribed.

## 2020-06-09 ENCOUNTER — RESULTS ENCOUNTER (OUTPATIENT)
Dept: PSYCHIATRY | Facility: CLINIC | Age: 20
End: 2020-06-09

## 2020-06-09 DIAGNOSIS — Z79.899 HIGH RISK MEDICATION USE: ICD-10-CM

## 2020-06-26 ENCOUNTER — RESULTS ENCOUNTER (OUTPATIENT)
Dept: PSYCHIATRY | Facility: CLINIC | Age: 20
End: 2020-06-26

## 2020-06-26 DIAGNOSIS — Z79.899 HIGH RISK MEDICATION USE: ICD-10-CM

## 2020-06-30 ENCOUNTER — TELEMEDICINE (OUTPATIENT)
Dept: PSYCHIATRY | Facility: CLINIC | Age: 20
End: 2020-06-30

## 2020-06-30 VITALS
BODY MASS INDEX: 25.99 KG/M2 | WEIGHT: 156 LBS | SYSTOLIC BLOOD PRESSURE: 110 MMHG | DIASTOLIC BLOOD PRESSURE: 68 MMHG | HEIGHT: 65 IN | HEART RATE: 101 BPM

## 2020-06-30 DIAGNOSIS — F39 UNSPECIFIED MOOD (AFFECTIVE) DISORDER (HCC): ICD-10-CM

## 2020-06-30 DIAGNOSIS — F51.05 INSOMNIA DUE TO MENTAL DISORDER: ICD-10-CM

## 2020-06-30 DIAGNOSIS — F41.9 ANXIETY DISORDER, UNSPECIFIED TYPE: ICD-10-CM

## 2020-06-30 DIAGNOSIS — F84.0 AUTISM SPECTRUM DISORDER: ICD-10-CM

## 2020-06-30 PROCEDURE — 99214 OFFICE O/P EST MOD 30 MIN: CPT | Performed by: NURSE PRACTITIONER

## 2020-06-30 RX ORDER — HYDROXYZINE HYDROCHLORIDE 25 MG/1
25 TABLET, FILM COATED ORAL
Qty: 30 TABLET | Refills: 1 | Status: SHIPPED | OUTPATIENT
Start: 2020-06-30 | End: 2020-07-28 | Stop reason: SDUPTHER

## 2020-06-30 RX ORDER — CLONIDINE HYDROCHLORIDE 0.2 MG/1
TABLET ORAL
Qty: 60 TABLET | Refills: 1 | Status: SHIPPED | OUTPATIENT
Start: 2020-06-30 | End: 2020-07-28 | Stop reason: SDUPTHER

## 2020-06-30 RX ORDER — QUETIAPINE FUMARATE 300 MG/1
300 TABLET, FILM COATED ORAL NIGHTLY
Qty: 30 TABLET | Refills: 1 | Status: SHIPPED | OUTPATIENT
Start: 2020-06-30 | End: 2020-07-28 | Stop reason: SDUPTHER

## 2020-06-30 RX ORDER — BENZTROPINE MESYLATE 1 MG/1
0.5 TABLET ORAL 2 TIMES DAILY
Qty: 30 TABLET | Refills: 1 | Status: SHIPPED | OUTPATIENT
Start: 2020-06-30 | End: 2020-07-28 | Stop reason: SDUPTHER

## 2020-06-30 RX ORDER — QUETIAPINE FUMARATE 100 MG/1
TABLET, FILM COATED ORAL
Qty: 90 TABLET | Refills: 1 | Status: SHIPPED | OUTPATIENT
Start: 2020-06-30 | End: 2020-07-28 | Stop reason: SDUPTHER
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
## 2020-07-28 ENCOUNTER — TELEMEDICINE (OUTPATIENT)
Dept: PSYCHIATRY | Facility: CLINIC | Age: 20
End: 2020-07-28

## 2020-07-28 VITALS
DIASTOLIC BLOOD PRESSURE: 83 MMHG | BODY MASS INDEX: 24.17 KG/M2 | SYSTOLIC BLOOD PRESSURE: 129 MMHG | OXYGEN SATURATION: 97 % | HEART RATE: 96 BPM | WEIGHT: 154 LBS | HEIGHT: 67 IN

## 2020-07-28 DIAGNOSIS — F41.9 ANXIETY DISORDER, UNSPECIFIED TYPE: ICD-10-CM

## 2020-07-28 DIAGNOSIS — F51.05 INSOMNIA DUE TO MENTAL DISORDER: ICD-10-CM

## 2020-07-28 DIAGNOSIS — F84.0 AUTISM SPECTRUM DISORDER: Primary | ICD-10-CM

## 2020-07-28 DIAGNOSIS — F80.2 RECEPTIVE EXPRESSIVE LANGUAGE DISORDER: ICD-10-CM

## 2020-07-28 DIAGNOSIS — F39 UNSPECIFIED MOOD (AFFECTIVE) DISORDER (HCC): ICD-10-CM

## 2020-07-28 PROCEDURE — 99214 OFFICE O/P EST MOD 30 MIN: CPT | Performed by: NURSE PRACTITIONER

## 2020-07-28 RX ORDER — CHOLECALCIFEROL (VITAMIN D3) 125 MCG
5 CAPSULE ORAL NIGHTLY
Qty: 30 TABLET | Refills: 1 | Status: SHIPPED | OUTPATIENT
Start: 2020-07-28 | End: 2020-08-25 | Stop reason: SDUPTHER

## 2020-07-28 RX ORDER — HYDROXYZINE 50 MG/1
50 TABLET, FILM COATED ORAL
Qty: 30 TABLET | Refills: 1 | Status: SHIPPED | OUTPATIENT
Start: 2020-07-28 | End: 2020-08-25 | Stop reason: SDUPTHER

## 2020-07-28 RX ORDER — QUETIAPINE FUMARATE 300 MG/1
300 TABLET, FILM COATED ORAL NIGHTLY
Qty: 30 TABLET | Refills: 1 | Status: SHIPPED | OUTPATIENT
Start: 2020-07-28 | End: 2020-08-25 | Stop reason: SDUPTHER

## 2020-07-28 RX ORDER — QUETIAPINE FUMARATE 100 MG/1
TABLET, FILM COATED ORAL
Qty: 90 TABLET | Refills: 1 | Status: SHIPPED | OUTPATIENT
Start: 2020-07-28 | End: 2020-08-25 | Stop reason: SDUPTHER

## 2020-07-28 RX ORDER — BENZTROPINE MESYLATE 1 MG/1
0.5 TABLET ORAL 2 TIMES DAILY
Qty: 30 TABLET | Refills: 1 | Status: SHIPPED | OUTPATIENT
Start: 2020-07-28 | End: 2020-08-25 | Stop reason: SDUPTHER

## 2020-07-28 RX ORDER — CLONIDINE HYDROCHLORIDE 0.2 MG/1
TABLET ORAL
Qty: 60 TABLET | Refills: 1 | Status: SHIPPED | OUTPATIENT
Start: 2020-07-28 | End: 2020-08-25 | Stop reason: SDUPTHER

## 2020-07-28 NOTE — PROGRESS NOTES
"This provider is located at [facility], [address], [place], [address] using [employer].  The patient is seen remotely at [facility], [address], [place] [address] via [employer], an encrypted service provided through [facility] with staff present.  The patient's condition being diagnosed/treated is appropriate for telemedicine. The provider identified herself as well as her credentials.  The patient and/or patient's guardian consent to be seen remotely, and when consent is given they understand that the consent allows for patient identifiable information to be sent to a third party as needed.   They may refuse to be seen remotely at any time. The electronic data is encrypted and password protected, and the patient has been advised of the potential risks to privacy notwithstanding such measures.      Char Butler is a 20 y.o. male who presents today for follow up    Chief Complaint:  Bipolar disorder, autism, impulse-control disorder, disruptive behavior, mixed receptive expressive language disorder    History of Present Illness:  Accompanied by: The patient was being seen with a therapist (Kary) and a nurse (Cindy) due to the patient's age, condition, and to assist with sharing of information and facilitation of visit.  The staff report that the patient's mood and behaviors have been at his typical baseline, and has been doing okay.  They report that the patient is always very hyperactive, and he is always running around or bouncing.  They report that he still likes to jump on his bed in his room.  They state that his sleep is poor.  They state that at night sometimes he will \"nap around\", but he is typically in his room making sounds and jumping on his bed, they report that he never comes out of the room or bothers anybody else.  They state his appetite has been good.  They deny any expressions of nightmares.  They deny any changes in his medical history since " his last encounter with this APRN.  They deny any known medication side effects.  They deny any abnormal musculoskeletal movements.  They deny any expressions of auditory or visual hallucinations.  They deny any expressions of suicidal or homicidal ideations or plans.  They report the patient is never physically violent towards other people, but when he is agitated or extremely excited he will go grab and squeeze somebody.  They report they do not feel he is doing this to hurt anybody, but that he just does not know what to do with his feelings at that time.  They report they feel like his current medication regimen is working okay for him, but they would like to track adjusting his hydroxyzine at bedtime or adding melatonin, or both, to see if it would help with his sleep.      The following portions of the patient's history were reviewed and updated as appropriate: allergies, current medications, past family history, past medical history, past social history, past surgical history and problem list.      Past Medical History:  Past Medical History:   Diagnosis Date   • Autism spectrum disorder    • Bipolar disorder (CMS/HCC)    • Disruptive behavior disorder    • Impulse control disorder    • Mixed receptive-expressive language disorder    • Psychosis (CMS/HCC)        Social History:  Social History     Socioeconomic History   • Marital status: Single     Spouse name: Not on file   • Number of children: Not on file   • Years of education: Not on file   • Highest education level: Not on file   Tobacco Use   • Smoking status: Unknown If Ever Smoked   Substance and Sexual Activity   • Alcohol use: Not Currently   • Drug use: Not Currently   • Sexual activity: Defer       Family History:  Family History   Family history unknown: Yes       Past Surgical History:  History reviewed. No pertinent surgical history.    Problem List:  There is no problem list on file for this patient.      Allergy:   Allergies   Allergen  Reactions   • Cinnamon Unknown (See Comments)     Taken from referral   • Fish-Derived Products Unknown (See Comments)     Taken from referral   • Nutmeg Oil (Myristica Oil) Unknown (See Comments)     Taken from referral   • Tomato Unknown - High Severity     Raw tomatoes        Current Medications:   Current Outpatient Medications   Medication Sig Dispense Refill   • benztropine (COGENTIN) 1 MG tablet Take 0.5 tablets by mouth 2 (Two) Times a Day. 30 tablet 1   • cloNIDine (CATAPRES) 0.2 MG tablet Take 0.5 tablets by mouth Daily With Breakfast & Lunch AND 1 tablet Every Night. 60 tablet 1   • hydrOXYzine (ATARAX) 50 MG tablet Take 1 tablet by mouth every night at bedtime. 30 tablet 1   • melatonin 5 MG tablet tablet Take 1 tablet by mouth Every Night. 30 tablet 1   • QUEtiapine (SEROquel) 100 MG tablet Take one pill three times daily at 7 am, 12 pm, and 4 pm 90 tablet 1   • QUEtiapine (SEROquel) 300 MG tablet Take 1 tablet by mouth Every Night. 30 tablet 1     No current facility-administered medications for this visit.        Review of Symptoms:    Review of Systems   Constitutional: Negative.    HENT: Negative.    Eyes: Negative.    Respiratory: Negative.    Cardiovascular: Negative.    Gastrointestinal: Negative.    Endocrine: Negative.    Genitourinary: Negative.    Musculoskeletal: Negative.    Skin: Negative.    Neurological: Negative.    Psychiatric/Behavioral: Positive for agitation, behavioral problems, decreased concentration, sleep disturbance, positive for hyperactivity and stress. Negative for dysphoric mood, hallucinations, self-injury, suicidal ideas and depressed mood. The patient is nervous/anxious.          Physical Exam:   Physical Exam   Constitutional: He appears well-nourished.   Neurological: He is alert.   Psychiatric: He is hyperactive. Cognition and memory are impaired. He expresses no homicidal and no suicidal ideation. He is noncommunicative.   Vitals reviewed.        Blood pressure  "129/83, pulse 96, height 170.2 cm (67\"), weight 69.9 kg (154 lb), SpO2 97 %. Body mass index is 24.12 kg/m².      Mental Status Exam:   Hygiene:   good  Cooperation:  Guarded  Eye Contact:  Poor  Psychomotor Behavior:  Restless  Affect:  Restricted  Mood: anxious  Hopelessness: Denies  Speech:  minimal, he can repeat sounds and some words, no reported change from patient's baseline  Thought Process:  Unable to demonstrate  Thought Content:  Unable to demonstrate  Suicidal:  None demonstrated  Homicidal:  None demonstrated  Hallucinations:  Not demonstrated today   Delusion:  Unable to demonstrate   Orientation:  Unable to evaluate and he will look at you and respond with sounds when you say his name  Reliability:  poor  Insight:  None  Judgement:  Impaired  Impulse Control:  Impaired  Physical/Medical Issues:  Chronic health issues, no acute physical or medical issues today       Lab Results:   No visits with results within 1 Month(s) from this visit.   Latest known visit with results is:   No results found for any previous visit.       Assessment/Plan   Problems Addressed this Visit     None      Visit Diagnoses     Autism spectrum disorder    -  Primary    Relevant Medications    benztropine (COGENTIN) 1 MG tablet    cloNIDine (CATAPRES) 0.2 MG tablet    hydrOXYzine (ATARAX) 50 MG tablet    QUEtiapine (SEROquel) 100 MG tablet    QUEtiapine (SEROquel) 300 MG tablet    Insomnia due to mental disorder        Relevant Medications    benztropine (COGENTIN) 1 MG tablet    cloNIDine (CATAPRES) 0.2 MG tablet    hydrOXYzine (ATARAX) 50 MG tablet    QUEtiapine (SEROquel) 100 MG tablet    QUEtiapine (SEROquel) 300 MG tablet    Anxiety disorder, unspecified type        Relevant Medications    benztropine (COGENTIN) 1 MG tablet    cloNIDine (CATAPRES) 0.2 MG tablet    hydrOXYzine (ATARAX) 50 MG tablet    QUEtiapine (SEROquel) 100 MG tablet    QUEtiapine (SEROquel) 300 MG tablet    Unspecified mood (affective) disorder " (CMS/[facility])        Relevant Medications    benztropine (COGENTIN) 1 MG tablet    cloNIDine (CATAPRES) 0.2 MG tablet    hydrOXYzine (ATARAX) 50 MG tablet    QUEtiapine (SEROquel) 100 MG tablet    QUEtiapine (SEROquel) 300 MG tablet    Receptive expressive language disorder              Visit Diagnoses:    ICD-10-CM ICD-9-CM   1. Autism spectrum disorder F84.0 299.00   2. Insomnia due to mental disorder F51.05 300.9     327.02   3. Anxiety disorder, unspecified type F41.9 300.00   4. Unspecified mood (affective) disorder (CMS/[facility]) F39 296.90   5. Receptive expressive language disorder F80.2 315.32         GOALS:  Short Term Goals: Patient will be compliant with medication, and patient will have no significant medication related side effects.  Patient will be engaged in psychotherapy as indicated.  Patient will report subjective improvement of symptoms.  Long term goals: To stabilize mood and treat/improve subjective symptoms, the patient will stay out of the hospital, the patient will be at an optimal level of functioning, and the patient will take all medications as prescribed.  The patient's nurse and therapist/guardians verbalized understanding and agreement with goals that were mutually set.      TREATMENT PLAN: Continue supportive psychotherapy efforts and medications as indicated.  Continue clonidine 0.1 mg by mouth twice daily for behaviors, take once in the morning and once at noon.  Continue clonidine 0.2 mg to take by mouth, for behaviors and sleep, once daily at bedtime.  Continue previously prescribed Cogentin 1 mg to take half a tablet, or a total of 0.5 mg, by mouth twice daily.  The nurse and therapist report the patient came to their facility on this medication and aren't sure if it is for symptoms management or to treat medication side effects/complications.  They deny any abnormal musculoskeletal movements or symptoms of EPS or TD.  Continue Seroquel 100 mg to take by mouth 3 times daily, to take at 7  AM, 12 PM, and 4 PM for mood and behaviors.  Continue Seroquel 300 mg by mouth once daily at bedtime for mood, behaviors, and sleep.  Increase hydroxyzine to 50 mg by mouth once daily at bedtime for insomnia/sleep and anxiety.  Add melatonin 5 mg by mouth once daily at bedtime for sleep.  Pharmacological and Non-Pharmacological treatment options discussed during today's visit, including off label use of medications. Patient's caretakers/guardians acknowledged and verbally consented with current treatment plan and was educated on the importance of compliance with treatment and follow-up appointments.          MEDICATION ISSUES:  Discussed medication options and treatment plan of prescribed medication, any off label use of medication, as well as the risks, benefits, any black box warnings including increased suicidality, and side effects including but not limited to potential falls, dizziness, possible impaired driving, GI side effects (change in appetite, abdominal discomfort, nausea, vomiting, diarrhea, and/or constipation), dry mouth, somnolence, sedation, insomnia, activation, agitation, irritation, tremors, abnormal muscle movements or disorders, tardive dyskinesia, akathisia, asthenia, headache, sweating, possible bruising or rare bleeding, electrolyte and/or fluid abnormalities, change in blood pressure/heart rate/and or heart rhythm, hypotension, sexual dysfunction, rare impulse control problems, rare seizures, rare neuroleptic malignant syndrome, increased risk of death and cerebrovascular events, change in blood glucose and increased risk for diabetes, change in triglycerides and cholesterol and increased risk for dyslipidemia,  weight gain, weight gain that can become problematic to health, skin conditions and reactions, and metabolic adversities among others. Patient's caretakers/guardians are agreeable to call the office with any worsening of symptoms or onset of side effects, or if any concerns or  questions arise.  The contact information for the office is made available to the patient's guardians/caretakers. Patient's caretakers/guardians are agreeable to call 911 or go to the nearest ER should they begin having any SI/HI, or if any urgent concerns arise. No medication side effects or related complaints today.         MEDS ORDERED DURING VISIT:  New Medications Ordered This Visit   Medications   • benztropine (COGENTIN) 1 MG tablet     Sig: Take 0.5 tablets by mouth 2 (Two) Times a Day.     Dispense:  30 tablet     Refill:  1   • cloNIDine (CATAPRES) 0.2 MG tablet     Sig: Take 0.5 tablets by mouth Daily With Breakfast & Lunch AND 1 tablet Every Night.     Dispense:  60 tablet     Refill:  1   • hydrOXYzine (ATARAX) 50 MG tablet     Sig: Take 1 tablet by mouth every night at bedtime.     Dispense:  30 tablet     Refill:  1   • QUEtiapine (SEROquel) 100 MG tablet     Sig: Take one pill three times daily at 7 am, 12 pm, and 4 pm     Dispense:  90 tablet     Refill:  1   • QUEtiapine (SEROquel) 300 MG tablet     Sig: Take 1 tablet by mouth Every Night.     Dispense:  30 tablet     Refill:  1   • melatonin 5 MG tablet tablet     Sig: Take 1 tablet by mouth Every Night.     Dispense:  30 tablet     Refill:  1       Return in about 4 weeks (around 8/25/2020), or if symptoms worsen or fail to improve, for Recheck.       Progress toward goal: Not at goal    Functional Status: Severe impairment    Prognosis: Guarded with Ongoing Treatment     Treatment plan completed: 4/28/20.            This document has been electronically signed by JOSUE Antoine  July 28, 2020 18:36    Please note that portions of this note were completed with a voice recognition program. Efforts were made to edit dictation, but occasionally words are mistranscribed.

## 2020-08-25 ENCOUNTER — TELEMEDICINE (OUTPATIENT)
Dept: PSYCHIATRY | Facility: CLINIC | Age: 20
End: 2020-08-25

## 2020-08-25 VITALS
HEIGHT: 65 IN | WEIGHT: 151 LBS | DIASTOLIC BLOOD PRESSURE: 79 MMHG | SYSTOLIC BLOOD PRESSURE: 107 MMHG | OXYGEN SATURATION: 98 % | TEMPERATURE: 98.1 F | HEART RATE: 102 BPM | BODY MASS INDEX: 25.16 KG/M2

## 2020-08-25 DIAGNOSIS — F51.05 INSOMNIA DUE TO MENTAL DISORDER: ICD-10-CM

## 2020-08-25 DIAGNOSIS — F84.0 AUTISM SPECTRUM DISORDER: Primary | ICD-10-CM

## 2020-08-25 DIAGNOSIS — F39 UNSPECIFIED MOOD (AFFECTIVE) DISORDER (HCC): ICD-10-CM

## 2020-08-25 DIAGNOSIS — F41.9 ANXIETY DISORDER, UNSPECIFIED TYPE: ICD-10-CM

## 2020-08-25 DIAGNOSIS — F80.2 RECEPTIVE EXPRESSIVE LANGUAGE DISORDER: ICD-10-CM

## 2020-08-25 PROCEDURE — 99214 OFFICE O/P EST MOD 30 MIN: CPT | Performed by: NURSE PRACTITIONER

## 2020-08-25 RX ORDER — QUETIAPINE FUMARATE 100 MG/1
TABLET, FILM COATED ORAL
Qty: 90 TABLET | Refills: 1 | Status: SHIPPED | OUTPATIENT
Start: 2020-08-25 | End: 2020-09-22 | Stop reason: SDUPTHER

## 2020-08-25 RX ORDER — BENZTROPINE MESYLATE 1 MG/1
0.5 TABLET ORAL 2 TIMES DAILY
Qty: 30 TABLET | Refills: 1 | Status: SHIPPED | OUTPATIENT
Start: 2020-08-25 | End: 2020-09-22 | Stop reason: SDUPTHER

## 2020-08-25 RX ORDER — CHOLECALCIFEROL (VITAMIN D3) 125 MCG
5 CAPSULE ORAL NIGHTLY
Qty: 30 TABLET | Refills: 1 | Status: SHIPPED | OUTPATIENT
Start: 2020-08-25 | End: 2020-09-22 | Stop reason: SDUPTHER

## 2020-08-25 RX ORDER — HYDROXYZINE 50 MG/1
50 TABLET, FILM COATED ORAL
Qty: 30 TABLET | Refills: 1 | Status: SHIPPED | OUTPATIENT
Start: 2020-08-25 | End: 2020-09-22 | Stop reason: SDUPTHER

## 2020-08-25 RX ORDER — CLONIDINE HYDROCHLORIDE 0.2 MG/1
TABLET ORAL
Qty: 60 TABLET | Refills: 1 | Status: SHIPPED | OUTPATIENT
Start: 2020-08-25 | End: 2020-09-22 | Stop reason: SDUPTHER

## 2020-08-25 RX ORDER — QUETIAPINE FUMARATE 300 MG/1
300 TABLET, FILM COATED ORAL NIGHTLY
Qty: 30 TABLET | Refills: 1 | Status: SHIPPED | OUTPATIENT
Start: 2020-08-25 | End: 2020-09-22 | Stop reason: SDUPTHER

## 2020-08-25 RX ORDER — TRAZODONE HYDROCHLORIDE 50 MG/1
50 TABLET ORAL NIGHTLY
Qty: 30 TABLET | Refills: 1 | Status: SHIPPED | OUTPATIENT
Start: 2020-08-25 | End: 2020-09-22 | Stop reason: SDUPTHER

## 2020-08-25 NOTE — PROGRESS NOTES
This provider is located at the [facility] (through [facility]), [address] [place], [address] using [employer].  The patient is seen remotely at [facility], [address], [place] [address] via [employer], an encrypted service provided through [facility] with staff present.  The patient's condition being diagnosed/treated is appropriate for telemedicine. The provider identified herself as well as her credentials.  The patient and/or patient's guardian consent to be seen remotely, and when consent is given they understand that the consent allows for patient identifiable information to be sent to a third party as needed.   They may refuse to be seen remotely at any time. The electronic data is encrypted and password protected, and the patient has been advised of the potential risks to privacy notwithstanding such measures.      hCar Butler is a 20 y.o. male who presents today for follow up    Chief Complaint:  Bipolar disorder, autism, impulse-control disorder, disruptive behavior, mixed receptive expressive language disorder    History of Present Illness:  Accompanied by: The patient was being seen with a therapist (Kary) and a nurse (Cindy) due to the patient's condition, and to assist with sharing of information and facilitation of visit.  The staff report that the patient has been doing pretty good since his last encounter, and that he is basically at his typical baseline.  They report he is typically hyperactive all the time, he likes to run all the time, he is very loud, and he likes to bounce on his bed.  They state sometimes it is very difficult just to get him to stop and sit down for a short period of time.  They state that his appetite has been good, but sometimes it takes him a while to eat.  They report his sleep is not good.  And he is up and down all night.  They report he does not nap in the day or appear tired.   They report he mostly does his own thing, he does not have much interaction with his peers, but there is 1 particular peer that he likes to agitate.  The staff report after he agitates to get to the point that the kid has a meltdown, then he will smile at the staff member.  They report he flips at his ears all the time, he does not pick at them, but he is always flipping his ears over.  He has not been in any holds.  They report classes/school just recently started, he will sit for a very short time, that he gets up and bounces around the room the rest of the time.  They are doing school in 2 separate 1 hour increments.  They reports that he does like to color and pain.  They report he typically always covers and paints in a [place].  They deny any changes in his medical history since his last encounter with his APRN.  They deny any known medication side effects or abnormal musculoskeletal movements.  They deny any expressions of auditory or visual hallucinations.  They deny any expressions of suicidal or homicidal ideations.  They report the patient's current medication regimen seems to be working okay for him, but they would like to consider adding something to assist with improving his sleep.        The following portions of the patient's history were reviewed and updated as appropriate: allergies, current medications, past family history, past medical history, past social history, past surgical history and problem list.        Past Medical History:  Past Medical History:   Diagnosis Date   • Autism spectrum disorder    • Bipolar disorder (CMS/HCC)    • Disruptive behavior disorder    • Impulse control disorder    • Mixed receptive-expressive language disorder    • Psychosis (CMS/HCC)        Social History:  Social History     Socioeconomic History   • Marital status: Single     Spouse name: Not on file   • Number of children: Not on file   • Years of education: Not on file   • Highest education level: Not on  file   Tobacco Use   • Smoking status: Unknown If Ever Smoked   Substance and Sexual Activity   • Alcohol use: Not Currently   • Drug use: Not Currently   • Sexual activity: Defer       Family History:  Family History   Family history unknown: Yes       Past Surgical History:  History reviewed. No pertinent surgical history.    Problem List:  There is no problem list on file for this patient.      Allergy:   Allergies   Allergen Reactions   • Cinnamon Unknown (See Comments)     Taken from referral   • Fish-Derived Products Unknown (See Comments)     Taken from referral   • Nutmeg Oil (Myristica Oil) Unknown (See Comments)     Taken from referral   • Tomato Unknown - High Severity     Raw tomatoes        Current Medications:   Current Outpatient Medications   Medication Sig Dispense Refill   • benztropine (COGENTIN) 1 MG tablet Take 0.5 tablets by mouth 2 (Two) Times a Day. 30 tablet 1   • cloNIDine (CATAPRES) 0.2 MG tablet Take 0.5 tablets by mouth Daily With Breakfast & Lunch AND 1 tablet Every Night. 60 tablet 1   • hydrOXYzine (ATARAX) 50 MG tablet Take 1 tablet by mouth every night at bedtime. 30 tablet 1   • melatonin 5 MG tablet tablet Take 1 tablet by mouth Every Night. 30 tablet 1   • QUEtiapine (SEROquel) 100 MG tablet Take one pill three times daily at 7 am, 12 pm, and 4 pm 90 tablet 1   • QUEtiapine (SEROquel) 300 MG tablet Take 1 tablet by mouth Every Night. 30 tablet 1   • traZODone (DESYREL) 50 MG tablet Take 1 tablet by mouth Every Night. 30 tablet 1     No current facility-administered medications for this visit.        Review of Symptoms:    Review of Systems   Constitutional: Negative.    HENT: Negative.    Eyes: Negative.    Respiratory: Negative.    Cardiovascular: Negative.    Gastrointestinal: Negative.    Endocrine: Negative.    Genitourinary: Negative.    Musculoskeletal: Negative.    Skin: Negative.    Neurological: Negative.    Psychiatric/Behavioral: Positive for agitation, behavioral  "problems, decreased concentration, sleep disturbance, positive for hyperactivity and stress. Negative for dysphoric mood, hallucinations, self-injury, suicidal ideas and depressed mood. The patient is nervous/anxious.          Physical Exam:   Physical Exam   Constitutional: He appears well-nourished.   Neurological: He is alert.   Psychiatric: He is hyperactive. Cognition and memory are impaired. He expresses no homicidal and no suicidal ideation. He is noncommunicative.   Vitals reviewed.        Blood pressure 107/79, pulse 102, temperature 98.1 °F (36.7 °C), height 165.1 cm (65\"), weight 68.5 kg (151 lb), SpO2 98 %. Body mass index is 25.13 kg/m².      Mental Status Exam:   Hygiene:   good  Cooperation:  Guarded  Eye Contact:  Poor  Psychomotor Behavior:  Restless  Affect:  Restricted  Mood: anxious  Hopelessness: Denies  Speech:  minimal, he can repeat sounds and some words, no reported change from patient's baseline  Thought Process:  Unable to demonstrate  Thought Content:  Unable to demonstrate  Suicidal:  None demonstrated  Homicidal:  None demonstrated  Hallucinations:  Not demonstrated today   Delusion:  Unable to demonstrate   Orientation:  Unable to evaluate and he will look at you and respond with sounds when you say his name  Reliability:  poor  Insight:  None  Judgement:  Impaired  Impulse Control:  Impaired  Physical/Medical Issues:  Chronic health issues, no acute physical or medical issues today       Lab Results:   No visits with results within 1 Month(s) from this visit.   Latest known visit with results is:   No results found for any previous visit.       Assessment/Plan   Problems Addressed this Visit     None      Visit Diagnoses     Autism spectrum disorder    -  Primary    Relevant Medications    benztropine (COGENTIN) 1 MG tablet    cloNIDine (CATAPRES) 0.2 MG tablet    hydrOXYzine (ATARAX) 50 MG tablet    QUEtiapine (SEROquel) 100 MG tablet    QUEtiapine (SEROquel) 300 MG tablet    " traZODone (DESYREL) 50 MG tablet    Insomnia due to mental disorder        Relevant Medications    benztropine (COGENTIN) 1 MG tablet    cloNIDine (CATAPRES) 0.2 MG tablet    hydrOXYzine (ATARAX) 50 MG tablet    melatonin 5 MG tablet tablet    QUEtiapine (SEROquel) 100 MG tablet    QUEtiapine (SEROquel) 300 MG tablet    traZODone (DESYREL) 50 MG tablet    Anxiety disorder, unspecified type        Relevant Medications    benztropine (COGENTIN) 1 MG tablet    cloNIDine (CATAPRES) 0.2 MG tablet    hydrOXYzine (ATARAX) 50 MG tablet    QUEtiapine (SEROquel) 100 MG tablet    QUEtiapine (SEROquel) 300 MG tablet    traZODone (DESYREL) 50 MG tablet    Unspecified mood (affective) disorder (CMS/HCC)        Relevant Medications    benztropine (COGENTIN) 1 MG tablet    hydrOXYzine (ATARAX) 50 MG tablet    QUEtiapine (SEROquel) 100 MG tablet    QUEtiapine (SEROquel) 300 MG tablet    traZODone (DESYREL) 50 MG tablet    Receptive expressive language disorder              Visit Diagnoses:    ICD-10-CM ICD-9-CM   1. Autism spectrum disorder F84.0 299.00   2. Insomnia due to mental disorder F51.05 300.9     327.02   3. Anxiety disorder, unspecified type F41.9 300.00   4. Unspecified mood (affective) disorder (CMS/HCC) F39 296.90   5. Receptive expressive language disorder F80.2 315.32         GOALS:  Short Term Goals: Patient will be compliant with medication, and patient will have no significant medication related side effects.  Patient will be engaged in psychotherapy as indicated.  Patient will report subjective improvement of symptoms.  Long term goals: To stabilize mood and treat/improve subjective symptoms, the patient will stay out of the hospital, the patient will be at an optimal level of functioning, and the patient will take all medications as prescribed.  The patient's nurse and therapist/guardians verbalized understanding and agreement with goals that were mutually set.      TREATMENT PLAN: Continue supportive  psychotherapy efforts and medications as indicated.  Continue clonidine 0.1 mg by mouth twice daily for behaviors, take once in the morning and once at noon.  Continue clonidine 0.2 mg to take by mouth, for behaviors and sleep, once daily at bedtime.  Continue previously prescribed Cogentin 1 mg to take half a tablet, or a total of 0.5 mg, by mouth twice daily.  Continue Seroquel 100 mg to take by mouth 3 times daily, to take at 7 AM, 12 PM, and 4 PM for mood and behaviors.  Continue Seroquel 300 mg by mouth once daily at bedtime for mood, behaviors, and sleep.  Continue hydroxyzine 50 mg by mouth once daily at bedtime for insomnia/sleep and anxiety.  Continue melatonin 5 mg by mouth once daily at bedtime for sleep.  Start trazodone 50 mg by mouth once daily at bedtime for sleep.  Pharmacological and Non-Pharmacological treatment options discussed during today's visit, including off label use of medications. Patient's caretakers/guardians acknowledged and verbally consented with current treatment plan and was educated on the importance of compliance with treatment and follow-up appointments.          MEDICATION ISSUES:  Discussed medication options and treatment plan of prescribed medication, any off label use of medication, as well as the risks, benefits, any black box warnings including increased suicidality, and side effects including but not limited to potential falls, dizziness, possible impaired driving, GI side effects (change in appetite, abdominal discomfort, nausea, vomiting, diarrhea, and/or constipation), dry mouth, somnolence, sedation, insomnia, activation, agitation, irritation, tremors, abnormal muscle movements or disorders, tardive dyskinesia, akathisia, asthenia, headache, sweating, possible bruising or rare bleeding, electrolyte and/or fluid abnormalities, change in blood pressure/heart rate/and or heart rhythm, hypotension, sexual dysfunction, rare impulse control problems, rare seizures, rare  neuroleptic malignant syndrome, increased risk of death and cerebrovascular events, change in blood glucose and increased risk for diabetes, change in triglycerides and cholesterol and increased risk for dyslipidemia,  weight gain, weight gain that can become problematic to health, skin conditions and reactions, and metabolic adversities among others. Patient's caretakers/guardians are agreeable to call the office with any worsening of symptoms or onset of side effects, or if any concerns or questions arise.  The contact information for the office is made available to the patient's guardians/caretakers. Patient's caretakers/guardians are agreeable to call 911 or go to the nearest ER should they begin having any SI/HI, or if any urgent concerns arise. No medication side effects or related complaints today.         SAFETY PLAN:  Patient/guardian was encouraged to call the clinic with any questions or concerns.  Patient/guardian was informed of access to emergency care. If patient were to develop any significant symptomatology, suicidal ideation, homicidal ideation, any concerns, or feel unsafe at any time they are to call the clinic and if unable to get immediate assistance should immediately call 911 or go to the nearest emergency room.  The Guardian is advised to remove or secure (lock away) all lethal weapons (including firearms/guns) and sharps (including razors, scissors, knives, sharps, objects to start fires, etc.).  All medications (including any prescribed and any over the counter medications) should be stored in a safe and secured/locked location that is not obtainable by children/adolescents, or the patient.  The guardian should administer all medications as indicated, prescribed and OTC, to the patient for safety and compliance.  The guardian/caretakers verbalized understanding and agreed to comply with the safety plan discussed.   Patient/guardian was given an opportunity and encouraged to ask questions  about the medication, illness, and treatment. Patient/guardian contracted verbally for the following: If you are experiencing an emotional crisis or have thoughts of harming yourself or others, please go to your nearest local emergency room or call 911. Will continue to re-assess medication response and side effects frequently to establish efficacy and ensure safety. Treatment risks, adverse effects, any black box warnings, side effects, off label usage, alternative treatment options, and benefits of medication and treatment discussed with patient and guardian.  Patient/guardian verbalized understanding in their own words. The patient/guardian verbalized understanding and agreed to comply with the safety plan discussed in their own words.  Patient/guardian given the number to the office. Number also available to the 24- hour suicide hotline.        MEDS ORDERED DURING VISIT:  New Medications Ordered This Visit   Medications   • benztropine (COGENTIN) 1 MG tablet     Sig: Take 0.5 tablets by mouth 2 (Two) Times a Day.     Dispense:  30 tablet     Refill:  1   • cloNIDine (CATAPRES) 0.2 MG tablet     Sig: Take 0.5 tablets by mouth Daily With Breakfast & Lunch AND 1 tablet Every Night.     Dispense:  60 tablet     Refill:  1   • hydrOXYzine (ATARAX) 50 MG tablet     Sig: Take 1 tablet by mouth every night at bedtime.     Dispense:  30 tablet     Refill:  1   • melatonin 5 MG tablet tablet     Sig: Take 1 tablet by mouth Every Night.     Dispense:  30 tablet     Refill:  1   • QUEtiapine (SEROquel) 100 MG tablet     Sig: Take one pill three times daily at 7 am, 12 pm, and 4 pm     Dispense:  90 tablet     Refill:  1   • QUEtiapine (SEROquel) 300 MG tablet     Sig: Take 1 tablet by mouth Every Night.     Dispense:  30 tablet     Refill:  1   • traZODone (DESYREL) 50 MG tablet     Sig: Take 1 tablet by mouth Every Night.     Dispense:  30 tablet     Refill:  1       Return in about 4 weeks (around 9/22/2020), or if  symptoms worsen or fail to improve, for Recheck.       Progress toward goal: Not at goal    Functional Status: Severe impairment    Prognosis: Guarded with Ongoing Treatment     Treatment plan completed: 4/28/20.            This document has been electronically signed by JOSUE Antoine  August 25, 2020 18:35    Please note that portions of this note were completed with a voice recognition program. Efforts were made to edit dictation, but occasionally words are mistranscribed.

## 2020-09-22 ENCOUNTER — TELEMEDICINE (OUTPATIENT)
Dept: PSYCHIATRY | Facility: CLINIC | Age: 20
End: 2020-09-22

## 2020-09-22 VITALS
HEART RATE: 97 BPM | DIASTOLIC BLOOD PRESSURE: 78 MMHG | OXYGEN SATURATION: 97 % | BODY MASS INDEX: 25.33 KG/M2 | WEIGHT: 152 LBS | TEMPERATURE: 98.3 F | SYSTOLIC BLOOD PRESSURE: 97 MMHG | HEIGHT: 65 IN

## 2020-09-22 DIAGNOSIS — F51.05 INSOMNIA DUE TO MENTAL DISORDER: ICD-10-CM

## 2020-09-22 DIAGNOSIS — F84.0 AUTISM SPECTRUM DISORDER: Primary | ICD-10-CM

## 2020-09-22 DIAGNOSIS — F41.9 ANXIETY DISORDER, UNSPECIFIED TYPE: ICD-10-CM

## 2020-09-22 DIAGNOSIS — F39 UNSPECIFIED MOOD (AFFECTIVE) DISORDER (HCC): ICD-10-CM

## 2020-09-22 DIAGNOSIS — F80.2 RECEPTIVE EXPRESSIVE LANGUAGE DISORDER: ICD-10-CM

## 2020-09-22 PROCEDURE — 99214 OFFICE O/P EST MOD 30 MIN: CPT | Performed by: NURSE PRACTITIONER

## 2020-09-22 RX ORDER — HYDROXYZINE 50 MG/1
50 TABLET, FILM COATED ORAL
Qty: 30 TABLET | Refills: 0 | Status: SHIPPED | OUTPATIENT
Start: 2020-09-22 | End: 2020-10-14 | Stop reason: SDUPTHER

## 2020-09-22 RX ORDER — TRAZODONE HYDROCHLORIDE 100 MG/1
100 TABLET ORAL NIGHTLY
Qty: 30 TABLET | Refills: 0 | Status: SHIPPED | OUTPATIENT
Start: 2020-09-22 | End: 2020-10-14 | Stop reason: SDUPTHER

## 2020-09-22 RX ORDER — BENZTROPINE MESYLATE 1 MG/1
0.5 TABLET ORAL 2 TIMES DAILY
Qty: 30 TABLET | Refills: 0 | Status: SHIPPED | OUTPATIENT
Start: 2020-09-22 | End: 2020-10-14 | Stop reason: SDUPTHER

## 2020-09-22 RX ORDER — CLONIDINE HYDROCHLORIDE 0.2 MG/1
TABLET ORAL
Qty: 60 TABLET | Refills: 0 | Status: SHIPPED | OUTPATIENT
Start: 2020-09-22 | End: 2020-10-14 | Stop reason: SDUPTHER

## 2020-09-22 RX ORDER — QUETIAPINE FUMARATE 100 MG/1
TABLET, FILM COATED ORAL
Qty: 90 TABLET | Refills: 0 | Status: SHIPPED | OUTPATIENT
Start: 2020-09-22 | End: 2020-10-14 | Stop reason: SDUPTHER

## 2020-09-22 RX ORDER — QUETIAPINE FUMARATE 300 MG/1
300 TABLET, FILM COATED ORAL NIGHTLY
Qty: 30 TABLET | Refills: 0 | Status: SHIPPED | OUTPATIENT
Start: 2020-09-22 | End: 2020-10-14 | Stop reason: SDUPTHER

## 2020-09-22 RX ORDER — CHOLECALCIFEROL (VITAMIN D3) 125 MCG
5 CAPSULE ORAL NIGHTLY
Qty: 30 TABLET | Refills: 0 | Status: SHIPPED | OUTPATIENT
Start: 2020-09-22 | End: 2020-10-14 | Stop reason: SDUPTHER

## 2020-09-22 NOTE — PROGRESS NOTES
"This provider is located at the [facility] (through [facility]), [address] [place], [address] using [employer].  The patient is seen remotely at [facility], [address], [place] [address] via [employer], an encrypted service provided through [facility] with staff present.  The patient's condition being diagnosed/treated is appropriate for telemedicine. The provider identified herself as well as her credentials.  The patient and/or patient's guardian consent to be seen remotely, and when consent is given they understand that the consent allows for patient identifiable information to be sent to a third party as needed.   They may refuse to be seen remotely at any time. The electronic data is encrypted and password protected, and the patient has been advised of the potential risks to privacy notwithstanding such measures.      Char Butler is a 20 y.o. male who presents today for follow up    Chief Complaint:  Bipolar disorder, autism, impulse-control disorder, disruptive behavior, mixed receptive expressive language disorder    History of Present Illness:  Accompanied by: The patient was being seen with a therapist (Kary) and a nurse (Cindy) due to the patient's condition, and to assist with sharing of information and facilitation of visit.  The staff report that the patient is \"doing all right\", and he seems at his baseline, but he still having difficulty sleeping.  They report he goes to bed around 7 PM or 7:30 PM, and he usually wakes up every morning around 1 AM and typically does not go back to sleep.  They report his appetite is still the same.  They report he wants to eat, he eats very slowly and will take his time, but if it is time to leave or you start to take his food away from him he will cram all of his food in his mouth at one time and eat that way.  They report he is having no trouble in classes behaviorally.  " "They report he is constantly making sounds, but does not seem bothered by his peers or staff.  They report sometimes he will say \"big scary monster\", \"what ghanshyam\", and \"Po Po\", as well as he will also sing the alphabet song and wheels on the bus go round and round.  The staff deny any changes in his medical history since his last encounter with this APRN.  They deny any medication side effects or abnormal musculoskeletal movements.  They deny any expressions of auditory or visual hallucinations.  They deny any expressions of suicidal or homicidal ideations.  The staff report they would like to try adjusting the patient's medications at today's encounter to continue to help improve his sleep.        The following portions of the patient's history were reviewed and updated as appropriate: allergies, current medications, past family history, past medical history, past social history, past surgical history and problem list.          Past Medical History:  Past Medical History:   Diagnosis Date   • Autism spectrum disorder    • Bipolar disorder (CMS/HCC)    • Disruptive behavior disorder    • Impulse control disorder    • Mixed receptive-expressive language disorder    • Psychosis (CMS/HCC)        Social History:  Social History     Socioeconomic History   • Marital status: Single     Spouse name: Not on file   • Number of children: Not on file   • Years of education: Not on file   • Highest education level: Not on file   Tobacco Use   • Smoking status: Unknown If Ever Smoked   Substance and Sexual Activity   • Alcohol use: Not Currently   • Drug use: Not Currently   • Sexual activity: Defer       Family History:  Family History   Family history unknown: Yes       Past Surgical History:  History reviewed. No pertinent surgical history.    Problem List:  There is no problem list on file for this patient.      Allergy:   Allergies   Allergen Reactions   • Cinnamon Unknown (See Comments)     Taken from referral   • " Fish-Derived Products Unknown (See Comments)     Taken from referral   • Nutmeg Oil (Myristica Oil) Unknown (See Comments)     Taken from referral   • Tomato Unknown - High Severity     Raw tomatoes        Current Medications:   Current Outpatient Medications   Medication Sig Dispense Refill   • benztropine (COGENTIN) 1 MG tablet Take 0.5 tablets by mouth 2 (Two) Times a Day. 30 tablet 0   • cloNIDine (CATAPRES) 0.2 MG tablet Take 0.5 tablets by mouth Daily With Breakfast & Lunch AND 1 tablet Every Night. 60 tablet 0   • hydrOXYzine (ATARAX) 50 MG tablet Take 1 tablet by mouth every night at bedtime. 30 tablet 0   • melatonin 5 MG tablet tablet Take 1 tablet by mouth Every Night. 30 tablet 0   • QUEtiapine (SEROquel) 100 MG tablet Take one pill three times daily at 7 am, 12 pm, and 4 pm 90 tablet 0   • QUEtiapine (SEROquel) 300 MG tablet Take 1 tablet by mouth Every Night. 30 tablet 0   • traZODone (DESYREL) 100 MG tablet Take 1 tablet by mouth Every Night. 30 tablet 0     No current facility-administered medications for this visit.        Review of Symptoms:    Review of Systems   Constitutional: Negative.    HENT: Negative.    Eyes: Negative.    Respiratory: Negative.    Cardiovascular: Negative.    Gastrointestinal: Negative.    Endocrine: Negative.    Genitourinary: Negative.    Musculoskeletal: Negative.    Skin: Negative.    Neurological: Negative.    Psychiatric/Behavioral: Positive for agitation, behavioral problems, decreased concentration, sleep disturbance, positive for hyperactivity and stress. Negative for dysphoric mood, hallucinations, self-injury, suicidal ideas and depressed mood. The patient is nervous/anxious.          Physical Exam:   Physical Exam   Neurological: He is alert.   Psychiatric: He is hyperactive. Cognition and memory are impaired. He expresses no homicidal and no suicidal ideation. He is noncommunicative.   Vitals reviewed.        Blood pressure 97/78, pulse 97, temperature 98.3 °F  "(36.8 °C), height 165.1 cm (65\"), weight 68.9 kg (152 lb), SpO2 97 %. Body mass index is 25.29 kg/m².      Mental Status Exam:   Hygiene:   good  Cooperation:  Guarded  Eye Contact:  Poor  Psychomotor Behavior:  Restless  Affect:  Restricted  Mood: anxious  Hopelessness: Denies  Speech:  minimal, he can repeat sounds and some words, no reported change from patient's baseline  Thought Process:  Unable to demonstrate  Thought Content:  Unable to demonstrate  Suicidal:  None demonstrated  Homicidal:  None demonstrated  Hallucinations:  Not demonstrated today   Delusion:  Unable to demonstrate   Orientation:  Unable to evaluate and he will look at you and respond with sounds when you say his name  Reliability:  poor  Insight:  None  Judgement:  Impaired  Impulse Control:  Impaired  Physical/Medical Issues:  Chronic health issues, no acute physical or medical issues today       Lab Results:   No visits with results within 1 Month(s) from this visit.   Latest known visit with results is:   No results found for any previous visit.       Assessment/Plan   Problems Addressed this Visit     None      Visit Diagnoses     Autism spectrum disorder    -  Primary    Relevant Medications    benztropine (COGENTIN) 1 MG tablet    cloNIDine (CATAPRES) 0.2 MG tablet    hydrOXYzine (ATARAX) 50 MG tablet    QUEtiapine (SEROquel) 100 MG tablet    QUEtiapine (SEROquel) 300 MG tablet    traZODone (DESYREL) 100 MG tablet    Anxiety disorder, unspecified type        Relevant Medications    benztropine (COGENTIN) 1 MG tablet    cloNIDine (CATAPRES) 0.2 MG tablet    hydrOXYzine (ATARAX) 50 MG tablet    QUEtiapine (SEROquel) 100 MG tablet    QUEtiapine (SEROquel) 300 MG tablet    traZODone (DESYREL) 100 MG tablet    Insomnia due to mental disorder        Relevant Medications    benztropine (COGENTIN) 1 MG tablet    cloNIDine (CATAPRES) 0.2 MG tablet    hydrOXYzine (ATARAX) 50 MG tablet    melatonin 5 MG tablet tablet    QUEtiapine (SEROquel) 100 " MG tablet    QUEtiapine (SEROquel) 300 MG tablet    traZODone (DESYREL) 100 MG tablet    Receptive expressive language disorder        Unspecified mood (affective) disorder (CMS/HCC)        Relevant Medications    benztropine (COGENTIN) 1 MG tablet    hydrOXYzine (ATARAX) 50 MG tablet    QUEtiapine (SEROquel) 100 MG tablet    QUEtiapine (SEROquel) 300 MG tablet    traZODone (DESYREL) 100 MG tablet          Visit Diagnoses:    ICD-10-CM ICD-9-CM   1. Autism spectrum disorder  F84.0 299.00   2. Anxiety disorder, unspecified type  F41.9 300.00   3. Insomnia due to mental disorder  F51.05 300.9     327.02   4. Receptive expressive language disorder  F80.2 315.32   5. Unspecified mood (affective) disorder (CMS/HCC)  F39 296.90         GOALS:  Short Term Goals: Patient will be compliant with medication, and patient will have no significant medication related side effects.  Patient will be engaged in psychotherapy as indicated.  Patient will report subjective improvement of symptoms.  Long term goals: To stabilize mood and treat/improve subjective symptoms, the patient will stay out of the hospital, the patient will be at an optimal level of functioning, and the patient will take all medications as prescribed.  The patient's nurse and therapist/guardians verbalized understanding and agreement with goals that were mutually set.      TREATMENT PLAN: Continue supportive psychotherapy efforts and medications as indicated.  Continue clonidine 0.1 mg by mouth twice daily for behaviors, take once in the morning and once at noon.  Continue clonidine 0.2 mg to take by mouth, for behaviors and sleep, once daily at bedtime.  Continue previously prescribed Cogentin 1 mg to take half a tablet, or a total of 0.5 mg, by mouth twice daily.  Continue Seroquel 100 mg to take by mouth 3 times daily, to take at 7 AM, 12 PM, and 4 PM for mood and behaviors.  Continue Seroquel 300 mg by mouth once daily at bedtime for mood, behaviors, and  sleep.  Continue hydroxyzine 50 mg by mouth once daily at bedtime for insomnia/sleep and anxiety.  Continue melatonin 5 mg by mouth once daily at bedtime for sleep.  Increase trazodone to 100 mg by mouth once daily at bedtime for sleep.  Pharmacological and Non-Pharmacological treatment options discussed during today's visit, including off label use of medications. Patient's caretakers/guardians acknowledged and verbally consented with current treatment plan and was educated on the importance of compliance with treatment and follow-up appointments.          MEDICATION ISSUES:  Discussed medication options and treatment plan of prescribed medication, any off label use of medication, as well as the risks, benefits, any black box warnings including increased suicidality, and side effects including but not limited to potential falls, dizziness, possible impaired driving, GI side effects (change in appetite, abdominal discomfort, nausea, vomiting, diarrhea, and/or constipation), dry mouth, somnolence, sedation, insomnia, activation, agitation, irritation, tremors, abnormal muscle movements or disorders, tardive dyskinesia, akathisia, asthenia, headache, sweating, possible bruising or rare bleeding, electrolyte and/or fluid abnormalities, change in blood pressure/heart rate/and or heart rhythm, hypotension, sexual dysfunction, rare impulse control problems, rare seizures, rare neuroleptic malignant syndrome, increased risk of death and cerebrovascular events, change in blood glucose and increased risk for diabetes, change in triglycerides and cholesterol and increased risk for dyslipidemia,  weight gain, weight gain that can become problematic to health, skin conditions and reactions, and metabolic adversities among others. Patient's caretakers/guardians are agreeable to call the office with any worsening of symptoms or onset of side effects, or if any concerns or questions arise.  The contact information for the office  is made available to the patient's guardians/caretakers. Patient's caretakers/guardians are agreeable to call 911 or go to the nearest ER should they begin having any SI/HI, or if any urgent concerns arise. No medication side effects or related complaints today.         SAFETY PLAN:  Patient/guardian was encouraged to call the clinic with any questions or concerns.  Patient/guardian was informed of access to emergency care. If patient were to develop any significant symptomatology, suicidal ideation, homicidal ideation, any concerns, or feel unsafe at any time they are to call the clinic and if unable to get immediate assistance should immediately call 911 or go to the nearest emergency room.  The Guardian is advised to remove or secure (lock away) all lethal weapons (including firearms/guns) and sharps (including razors, scissors, knives, sharps, objects to start fires, etc.).  All medications (including any prescribed and any over the counter medications) should be stored in a safe and secured/locked location that is not obtainable by children/adolescents, or the patient.  The guardian should administer all medications as indicated, prescribed and OTC, to the patient for safety and compliance.  The guardian/caretakers verbalized understanding and agreed to comply with the safety plan discussed.   Patient/guardian was given an opportunity and encouraged to ask questions about the medication, illness, and treatment. Patient/guardian contracted verbally for the following: If you are experiencing an emotional crisis or have thoughts of harming yourself or others, please go to your nearest local emergency room or call 911. Will continue to re-assess medication response and side effects frequently to establish efficacy and ensure safety. Treatment risks, adverse effects, any black box warnings, side effects, off label usage, alternative treatment options, and benefits of medication and treatment discussed with patient  and guardian.  Patient/guardian verbalized understanding in their own words. The patient/guardian verbalized understanding and agreed to comply with the safety plan discussed in their own words.  Patient/guardian given the number to the office. Number also available to the 24- hour suicide hotline.        MEDS ORDERED DURING VISIT:  New Medications Ordered This Visit   Medications   • benztropine (COGENTIN) 1 MG tablet     Sig: Take 0.5 tablets by mouth 2 (Two) Times a Day.     Dispense:  30 tablet     Refill:  0   • cloNIDine (CATAPRES) 0.2 MG tablet     Sig: Take 0.5 tablets by mouth Daily With Breakfast & Lunch AND 1 tablet Every Night.     Dispense:  60 tablet     Refill:  0   • hydrOXYzine (ATARAX) 50 MG tablet     Sig: Take 1 tablet by mouth every night at bedtime.     Dispense:  30 tablet     Refill:  0   • melatonin 5 MG tablet tablet     Sig: Take 1 tablet by mouth Every Night.     Dispense:  30 tablet     Refill:  0   • QUEtiapine (SEROquel) 100 MG tablet     Sig: Take one pill three times daily at 7 am, 12 pm, and 4 pm     Dispense:  90 tablet     Refill:  0   • QUEtiapine (SEROquel) 300 MG tablet     Sig: Take 1 tablet by mouth Every Night.     Dispense:  30 tablet     Refill:  0   • traZODone (DESYREL) 100 MG tablet     Sig: Take 1 tablet by mouth Every Night.     Dispense:  30 tablet     Refill:  0       Return in about 4 weeks (around 10/20/2020), or if symptoms worsen or fail to improve, for Recheck.       Progress toward goal: Not at goal    Functional Status: Severe impairment    Prognosis: Guarded with Ongoing Treatment     Treatment plan completed: 4/28/20.            This document has been electronically signed by JOSUE Antoine  September 22, 2020 12:34 EDT    Please note that portions of this note were completed with a voice recognition program. Efforts were made to edit dictation, but occasionally words are mistranscribed.

## 2020-10-14 DIAGNOSIS — F39 UNSPECIFIED MOOD (AFFECTIVE) DISORDER (HCC): ICD-10-CM

## 2020-10-14 DIAGNOSIS — F84.0 AUTISM SPECTRUM DISORDER: ICD-10-CM

## 2020-10-14 DIAGNOSIS — F51.05 INSOMNIA DUE TO MENTAL DISORDER: ICD-10-CM

## 2020-10-14 DIAGNOSIS — F41.9 ANXIETY DISORDER, UNSPECIFIED TYPE: ICD-10-CM

## 2020-10-14 RX ORDER — CHOLECALCIFEROL (VITAMIN D3) 125 MCG
5 CAPSULE ORAL NIGHTLY
Qty: 30 TABLET | Refills: 0 | Status: SHIPPED | OUTPATIENT
Start: 2020-10-14 | End: 2020-10-27 | Stop reason: SDUPTHER

## 2020-10-14 RX ORDER — CLONIDINE HYDROCHLORIDE 0.2 MG/1
TABLET ORAL
Qty: 60 TABLET | Refills: 0 | Status: SHIPPED | OUTPATIENT
Start: 2020-10-14 | End: 2020-10-27 | Stop reason: SDUPTHER

## 2020-10-14 RX ORDER — QUETIAPINE FUMARATE 100 MG/1
TABLET, FILM COATED ORAL
Qty: 90 TABLET | Refills: 0 | Status: SHIPPED | OUTPATIENT
Start: 2020-10-14 | End: 2020-10-27 | Stop reason: DRUGHIGH

## 2020-10-14 RX ORDER — QUETIAPINE FUMARATE 300 MG/1
300 TABLET, FILM COATED ORAL NIGHTLY
Qty: 30 TABLET | Refills: 0 | Status: SHIPPED | OUTPATIENT
Start: 2020-10-14 | End: 2020-10-27 | Stop reason: SDUPTHER

## 2020-10-14 RX ORDER — BENZTROPINE MESYLATE 1 MG/1
0.5 TABLET ORAL 2 TIMES DAILY
Qty: 30 TABLET | Refills: 0 | Status: SHIPPED | OUTPATIENT
Start: 2020-10-14 | End: 2020-10-27 | Stop reason: SDUPTHER

## 2020-10-14 RX ORDER — HYDROXYZINE 50 MG/1
50 TABLET, FILM COATED ORAL
Qty: 30 TABLET | Refills: 0 | Status: SHIPPED | OUTPATIENT
Start: 2020-10-14 | End: 2020-10-27 | Stop reason: SDUPTHER

## 2020-10-14 RX ORDER — TRAZODONE HYDROCHLORIDE 100 MG/1
100 TABLET ORAL NIGHTLY
Qty: 30 TABLET | Refills: 0 | Status: SHIPPED | OUTPATIENT
Start: 2020-10-14 | End: 2020-10-27 | Stop reason: SDUPTHER

## 2020-10-27 ENCOUNTER — TELEMEDICINE (OUTPATIENT)
Dept: PSYCHIATRY | Facility: CLINIC | Age: 20
End: 2020-10-27

## 2020-10-27 VITALS
BODY MASS INDEX: 24.49 KG/M2 | TEMPERATURE: 98.1 F | HEIGHT: 65 IN | DIASTOLIC BLOOD PRESSURE: 89 MMHG | OXYGEN SATURATION: 99 % | WEIGHT: 147 LBS | HEART RATE: 112 BPM | SYSTOLIC BLOOD PRESSURE: 129 MMHG

## 2020-10-27 DIAGNOSIS — F51.05 INSOMNIA DUE TO MENTAL DISORDER: ICD-10-CM

## 2020-10-27 DIAGNOSIS — F80.2 RECEPTIVE EXPRESSIVE LANGUAGE DISORDER: ICD-10-CM

## 2020-10-27 DIAGNOSIS — F41.9 ANXIETY DISORDER, UNSPECIFIED TYPE: ICD-10-CM

## 2020-10-27 DIAGNOSIS — F84.0 AUTISM SPECTRUM DISORDER: Primary | ICD-10-CM

## 2020-10-27 DIAGNOSIS — F39 UNSPECIFIED MOOD (AFFECTIVE) DISORDER (HCC): ICD-10-CM

## 2020-10-27 PROCEDURE — 99214 OFFICE O/P EST MOD 30 MIN: CPT | Performed by: NURSE PRACTITIONER

## 2020-10-27 RX ORDER — HYDROXYZINE 50 MG/1
50 TABLET, FILM COATED ORAL
Qty: 30 TABLET | Refills: 0 | Status: SHIPPED | OUTPATIENT
Start: 2020-10-27 | End: 2020-11-24 | Stop reason: SDUPTHER

## 2020-10-27 RX ORDER — CLONIDINE HYDROCHLORIDE 0.2 MG/1
TABLET ORAL
Qty: 60 TABLET | Refills: 0 | Status: SHIPPED | OUTPATIENT
Start: 2020-10-27 | End: 2020-11-24 | Stop reason: SDUPTHER

## 2020-10-27 RX ORDER — CHOLECALCIFEROL (VITAMIN D3) 125 MCG
5 CAPSULE ORAL NIGHTLY
Qty: 30 TABLET | Refills: 0 | Status: SHIPPED | OUTPATIENT
Start: 2020-10-27 | End: 2020-11-24 | Stop reason: SDUPTHER

## 2020-10-27 RX ORDER — BENZTROPINE MESYLATE 1 MG/1
0.5 TABLET ORAL 2 TIMES DAILY
Qty: 30 TABLET | Refills: 0 | Status: SHIPPED | OUTPATIENT
Start: 2020-10-27 | End: 2020-11-24

## 2020-10-27 RX ORDER — QUETIAPINE FUMARATE 300 MG/1
300 TABLET, FILM COATED ORAL 2 TIMES DAILY
Qty: 60 TABLET | Refills: 0 | Status: SHIPPED | OUTPATIENT
Start: 2020-10-27 | End: 2020-11-24 | Stop reason: SDUPTHER

## 2020-10-27 RX ORDER — TRAZODONE HYDROCHLORIDE 100 MG/1
100 TABLET ORAL NIGHTLY
Qty: 30 TABLET | Refills: 0 | Status: SHIPPED | OUTPATIENT
Start: 2020-10-27 | End: 2020-11-24 | Stop reason: SDUPTHER
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
## 2020-11-24 ENCOUNTER — TELEMEDICINE (OUTPATIENT)
Dept: PSYCHIATRY | Facility: CLINIC | Age: 20
End: 2020-11-24

## 2020-11-24 VITALS
DIASTOLIC BLOOD PRESSURE: 81 MMHG | HEIGHT: 65 IN | TEMPERATURE: 97.9 F | WEIGHT: 153 LBS | HEART RATE: 116 BPM | BODY MASS INDEX: 25.49 KG/M2 | SYSTOLIC BLOOD PRESSURE: 109 MMHG

## 2020-11-24 DIAGNOSIS — F80.2 RECEPTIVE EXPRESSIVE LANGUAGE DISORDER: ICD-10-CM

## 2020-11-24 DIAGNOSIS — F41.9 ANXIETY DISORDER, UNSPECIFIED TYPE: ICD-10-CM

## 2020-11-24 DIAGNOSIS — F84.0 AUTISM SPECTRUM DISORDER: Primary | ICD-10-CM

## 2020-11-24 DIAGNOSIS — F39 UNSPECIFIED MOOD (AFFECTIVE) DISORDER (HCC): ICD-10-CM

## 2020-11-24 DIAGNOSIS — F51.05 INSOMNIA DUE TO MENTAL DISORDER: ICD-10-CM

## 2020-11-24 PROCEDURE — 99214 OFFICE O/P EST MOD 30 MIN: CPT | Performed by: NURSE PRACTITIONER

## 2020-11-24 RX ORDER — CHOLECALCIFEROL (VITAMIN D3) 125 MCG
5 CAPSULE ORAL NIGHTLY
Qty: 30 TABLET | Refills: 1 | Status: SHIPPED | OUTPATIENT
Start: 2020-11-24 | End: 2020-12-29 | Stop reason: SDUPTHER

## 2020-11-24 RX ORDER — QUETIAPINE FUMARATE 300 MG/1
300 TABLET, FILM COATED ORAL 2 TIMES DAILY
Qty: 60 TABLET | Refills: 1 | Status: SHIPPED | OUTPATIENT
Start: 2020-11-24 | End: 2020-12-29 | Stop reason: SDUPTHER

## 2020-11-24 RX ORDER — CLONIDINE HYDROCHLORIDE 0.2 MG/1
TABLET ORAL
Qty: 60 TABLET | Refills: 1 | Status: SHIPPED | OUTPATIENT
Start: 2020-11-24 | End: 2020-12-29 | Stop reason: SDUPTHER

## 2020-11-24 RX ORDER — HYDROXYZINE 50 MG/1
50 TABLET, FILM COATED ORAL
Qty: 30 TABLET | Refills: 1 | Status: SHIPPED | OUTPATIENT
Start: 2020-11-24 | End: 2020-12-29 | Stop reason: SDUPTHER

## 2020-11-24 RX ORDER — TRAZODONE HYDROCHLORIDE 100 MG/1
100 TABLET ORAL NIGHTLY
Qty: 30 TABLET | Refills: 1 | Status: SHIPPED | OUTPATIENT
Start: 2020-11-24 | End: 2020-12-29 | Stop reason: SDUPTHER

## 2020-11-24 RX ORDER — DIVALPROEX SODIUM 250 MG/1
250 TABLET, DELAYED RELEASE ORAL NIGHTLY
Qty: 30 TABLET | Refills: 1 | Status: SHIPPED | OUTPATIENT
Start: 2020-11-24 | End: 2020-12-22 | Stop reason: SDUPTHER

## 2020-11-24 NOTE — PROGRESS NOTES
This provider is located at the [facility] (through [facility]), [address] [place], [address] using [employer].  The patient is seen remotely at [facility], [address], [place] [address] via [employer], an encrypted service provided through [facility] with staff present.  The patient's condition being diagnosed/treated is appropriate for telemedicine. The provider identified herself as well as her credentials.  The patient and/or patient's guardian consent to be seen remotely, and when consent is given they understand that the consent allows for patient identifiable information to be sent to a third party as needed.   They may refuse to be seen remotely at any time. The electronic data is encrypted and password protected, and the patient has been advised of the potential risks to privacy notwithstanding such measures.      Char Butler is a 20 y.o. male who presents today for follow up    Chief Complaint:  Bipolar disorder, autism, impulse-control disorder, disruptive behavior, mixed receptive expressive language disorder    History of Present Illness:  Accompanied by: The patient was being seen with a therapist (Kary) and a nurse (Cindy) due to the patient's condition, and to assist with sharing of information and facilitation of visit.  The staff at the [facility] describe the patient's moods and behaviors as about the same, and hyperactive, over the last few weeks.  The staff report the recent increase in Seroquel helped for about 2 days with calming the patient down, but he is back to his typical hyperactive self.  They report he typically runs a lot and is unable to sit still.  They report his hyperactivity is disruptive to others.  They deny any physical aggression this month, but they report he has one specific peer that he likes to instigate.  They report when he instigates this  peer, and this peer gets agitated and has an outburst, the patient will smile as if he knows what he is doing and knows that it is wrong.  The staff report typically the patient does not respond to the rest of his peers, and he acts as if they are not even there most of the time.  They report he is aware of his other peers, because if things are getting chaotic around him he does become physically aggressive by squeezing the staff really hard, or bouncing into them.  They report he likes to bounce on his bed a lot still.  The staff report the patient's appetite as good.  The staff report the patient's sleep as poor.  The staff report the patient does not sleep very much.  The staff deny any new medical problems or changes in medications since last appointment with this facility.  The staff report compliance with the patient's current medication regimen.  The staff deny any current side effects from the patient's current medication regimen.  The staff deny any noticeable abnormal muscle movements or tics, or changes from the patient's baseline.  The staff would like to adjust the patient's medications at this visit to help with his moods and behaviors, as well as his hyperactivity.  The staff deny any expressions of auditory hallucinations or visual hallucinations, and the staff deny any expressions of suicidal ideations or homicidal ideations, but it is hard to determine due to the nonverbal status of the patient.      The following portions of the patient's history were reviewed and updated as appropriate: allergies, current medications, past family history, past medical history, past social history, past surgical history and problem list.          Past Medical History:  Past Medical History:   Diagnosis Date   • Autism spectrum disorder    • Bipolar disorder (CMS/HCC)    • Disruptive behavior disorder    • Impulse control disorder    • Mixed receptive-expressive language disorder    • Psychosis (CMS/HCC)         Social History:  Social History     Socioeconomic History   • Marital status: Single     Spouse name: Not on file   • Number of children: Not on file   • Years of education: Not on file   • Highest education level: Not on file   Tobacco Use   • Smoking status: Unknown If Ever Smoked   Substance and Sexual Activity   • Alcohol use: Not Currently   • Drug use: Not Currently   • Sexual activity: Defer       Family History:  Family History   Family history unknown: Yes       Past Surgical History:  History reviewed. No pertinent surgical history.    Problem List:  There is no problem list on file for this patient.      Allergy:   Allergies   Allergen Reactions   • Cinnamon Unknown (See Comments)     Taken from referral   • Fish-Derived Products Unknown (See Comments)     Taken from referral   • Nutmeg Oil (Myristica Oil) Unknown (See Comments)     Taken from referral   • Tomato Unknown - High Severity     Raw tomatoes        Current Medications:   Current Outpatient Medications   Medication Sig Dispense Refill   • cloNIDine (CATAPRES) 0.2 MG tablet Take 0.5 tablets by mouth Daily With Breakfast & Lunch AND 1 tablet Every Night. 60 tablet 1   • divalproex (Depakote) 250 MG DR tablet Take 1 tablet by mouth Every Night. 30 tablet 1   • hydrOXYzine (ATARAX) 50 MG tablet Take 1 tablet by mouth every night at bedtime. 30 tablet 1   • melatonin 5 MG tablet tablet Take 1 tablet by mouth Every Night. 30 tablet 1   • QUEtiapine (SEROquel) 300 MG tablet Take 1 tablet by mouth 2 (Two) Times a Day. 60 tablet 1   • traZODone (DESYREL) 100 MG tablet Take 1 tablet by mouth Every Night. 30 tablet 1     No current facility-administered medications for this visit.        Review of Symptoms:    Review of Systems   Constitutional: Negative.    HENT: Negative.    Eyes: Negative.    Respiratory: Negative.    Cardiovascular: Negative.    Gastrointestinal: Negative.    Endocrine: Negative.    Genitourinary: Negative.    Musculoskeletal:  "Negative.    Skin: Negative.    Neurological: Negative.    Psychiatric/Behavioral: Positive for agitation, behavioral problems, decreased concentration, sleep disturbance, positive for hyperactivity and stress. The patient is nervous/anxious.          Physical Exam:   Physical Exam   Neurological: He is alert.   Psychiatric: He is hyperactive. Cognition and memory are impaired. He expresses no homicidal and no suicidal ideation. He is noncommunicative.   Vitals reviewed.        Blood pressure 109/81, pulse 116, temperature 97.9 °F (36.6 °C), height 165.1 cm (65\"), weight 69.4 kg (153 lb). Body mass index is 25.46 kg/m².      Mental Status Exam:   Hygiene:   good  Cooperation:  Guarded  Eye Contact:  Poor  Psychomotor Behavior:  Hyperactive  Affect:  Restricted  Mood: anxious  Hopelessness: Denies  Speech:  minimal, he can repeat sounds and some words, no reported change from patient's baseline  Thought Process:  Unable to demonstrate  Thought Content:  Unable to demonstrate  Suicidal:  None demonstrated  Homicidal:  None demonstrated  Hallucinations:  Not demonstrated today   Delusion:  Unable to demonstrate   Orientation:  Unable to evaluate and he will look at you and respond with sounds when you say his name  Reliability:  unable to demonstrate  Insight:  None  Judgement:  Impaired  Impulse Control:  Impaired  Physical/Medical Issues:  Chronic health issues, no acute physical or medical issues today       Lab Results:   No visits with results within 1 Month(s) from this visit.   Latest known visit with results is:   No results found for any previous visit.       Assessment/Plan   Problems Addressed this Visit     None      Visit Diagnoses     Autism spectrum disorder    -  Primary    Relevant Medications    cloNIDine (CATAPRES) 0.2 MG tablet    hydrOXYzine (ATARAX) 50 MG tablet    QUEtiapine (SEROquel) 300 MG tablet    traZODone (DESYREL) 100 MG tablet    divalproex (Depakote) 250 MG DR tablet    Unspecified mood " (affective) disorder (CMS/HCC)        Relevant Medications    hydrOXYzine (ATARAX) 50 MG tablet    QUEtiapine (SEROquel) 300 MG tablet    traZODone (DESYREL) 100 MG tablet    divalproex (Depakote) 250 MG DR tablet    Anxiety disorder, unspecified type        Relevant Medications    cloNIDine (CATAPRES) 0.2 MG tablet    hydrOXYzine (ATARAX) 50 MG tablet    QUEtiapine (SEROquel) 300 MG tablet    traZODone (DESYREL) 100 MG tablet    Insomnia due to mental disorder        Relevant Medications    cloNIDine (CATAPRES) 0.2 MG tablet    hydrOXYzine (ATARAX) 50 MG tablet    melatonin 5 MG tablet tablet    QUEtiapine (SEROquel) 300 MG tablet    traZODone (DESYREL) 100 MG tablet    Receptive expressive language disorder          Diagnoses       Codes Comments    Autism spectrum disorder    -  Primary ICD-10-CM: F84.0  ICD-9-CM: 299.00     Unspecified mood (affective) disorder (CMS/HCC)     ICD-10-CM: F39  ICD-9-CM: 296.90     Anxiety disorder, unspecified type     ICD-10-CM: F41.9  ICD-9-CM: 300.00     Insomnia due to mental disorder     ICD-10-CM: F51.05  ICD-9-CM: 300.9, 327.02     Receptive expressive language disorder     ICD-10-CM: F80.2  ICD-9-CM: 315.32           Visit Diagnoses:    ICD-10-CM ICD-9-CM   1. Autism spectrum disorder  F84.0 299.00   2. Unspecified mood (affective) disorder (CMS/HCC)  F39 296.90   3. Anxiety disorder, unspecified type  F41.9 300.00   4. Insomnia due to mental disorder  F51.05 300.9     327.02   5. Receptive expressive language disorder  F80.2 315.32         GOALS:  Short Term Goals: Patient will be compliant with medication, and patient will have no significant medication related side effects.  Patient will be engaged in psychotherapy as indicated.  Patient will report subjective improvement of symptoms.  Long term goals: To stabilize mood and treat/improve subjective symptoms, the patient will stay out of the hospital, the patient will be at an optimal level of functioning, and the patient  will take all medications as prescribed.  The patient's nurse and therapist/guardians verbalized understanding and agreement with goals that were mutually set.      TREATMENT PLAN: Continue supportive psychotherapy efforts and medications as indicated.  Continue clonidine 0.1 mg by mouth twice daily for behaviors, take once in the morning and once at noon.  Continue clonidine 0.2 mg to take by mouth, for behaviors and sleep, once daily at bedtime.  Discontinue Cogentin at today's encounter.  Continue Seroquel 300 mg by mouth twice daily for mood, behaviors, and also sleep.  Continue hydroxyzine 50 mg by mouth once daily at bedtime for insomnia/sleep and anxiety.  Continue melatonin 5 mg by mouth once daily at bedtime for sleep.  Continue trazodone 100 mg by mouth once daily at bedtime for sleep.  Start Depakote 250 mg by mouth once daily at bedtime for mood and behaviors.  Pharmacological and Non-Pharmacological treatment options discussed during today's visit, including off label use of medications. Patient's caretakers/guardians acknowledged and verbally consented with current treatment plan and was educated on the importance of compliance with treatment and follow-up appointments.          MEDICATION ISSUES:  Discussed medication options and treatment plan of prescribed medication, any off label use of medication, as well as the risks, benefits, any black box warnings including increased suicidality, and side effects including but not limited to potential falls, dizziness, possible impaired driving, GI side effects (change in appetite, abdominal discomfort, nausea, vomiting, diarrhea, and/or constipation), dry mouth, somnolence, sedation, insomnia, activation, agitation, irritation, tremors, abnormal muscle movements or disorders, tardive dyskinesia, akathisia, asthenia, headache, sweating, possible bruising or rare bleeding, electrolyte and/or fluid abnormalities, change in blood pressure/heart rate/and or heart  rhythm, hypotension, sexual dysfunction, rare impulse control problems, rare seizures, rare neuroleptic malignant syndrome, increased risk of death and cerebrovascular events, change in blood glucose and increased risk for diabetes, change in triglycerides and cholesterol and increased risk for dyslipidemia,  weight gain, weight gain that can become problematic to health, skin conditions and reactions, and metabolic adversities among others. Patient's caretakers/guardians are agreeable to call the office with any worsening of symptoms or onset of side effects, or if any concerns or questions arise.  The contact information for the office is made available to the patient's guardians/caretakers. Patient's caretakers/guardians are agreeable to call 911 or go to the nearest ER should they begin having any SI/HI, or if any urgent concerns arise. No medication side effects or related complaints today.         SAFETY PLAN:  Patient/guardian was encouraged to call the clinic with any questions or concerns.  Patient/guardian was informed of access to emergency care. If patient were to develop any significant symptomatology, suicidal ideation, homicidal ideation, any concerns, or feel unsafe at any time they are to call the clinic and if unable to get immediate assistance should immediately call 911 or go to the nearest emergency room.  The Guardian is advised to remove or secure (lock away) all lethal weapons (including firearms/guns) and sharps (including razors, scissors, knives, sharps, objects to start fires, etc.).  All medications (including any prescribed and any over the counter medications) should be stored in a safe and secured/locked location that is not obtainable by children/adolescents, or the patient.  The guardian should administer all medications as indicated, prescribed and OTC, to the patient for safety and compliance.  The guardian/caretakers verbalized understanding and agreed to comply with the safety  plan discussed.   Patient/guardian was given an opportunity and encouraged to ask questions about the medication, illness, and treatment. Patient/guardian contracted verbally for the following: If you are experiencing an emotional crisis or have thoughts of harming yourself or others, please go to your nearest local emergency room or call 911. Will continue to re-assess medication response and side effects frequently to establish efficacy and ensure safety. Treatment risks, adverse effects, any black box warnings, side effects, off label usage, alternative treatment options, and benefits of medication and treatment discussed with patient and guardian.  Patient/guardian verbalized understanding in their own words. The patient/guardian verbalized understanding and agreed to comply with the safety plan discussed in their own words.  Patient/guardian given the number to the office. Number also available to the 24- hour suicide hotline.        MEDS ORDERED DURING VISIT:  New Medications Ordered This Visit   Medications   • cloNIDine (CATAPRES) 0.2 MG tablet     Sig: Take 0.5 tablets by mouth Daily With Breakfast & Lunch AND 1 tablet Every Night.     Dispense:  60 tablet     Refill:  1   • hydrOXYzine (ATARAX) 50 MG tablet     Sig: Take 1 tablet by mouth every night at bedtime.     Dispense:  30 tablet     Refill:  1   • melatonin 5 MG tablet tablet     Sig: Take 1 tablet by mouth Every Night.     Dispense:  30 tablet     Refill:  1   • QUEtiapine (SEROquel) 300 MG tablet     Sig: Take 1 tablet by mouth 2 (Two) Times a Day.     Dispense:  60 tablet     Refill:  1   • traZODone (DESYREL) 100 MG tablet     Sig: Take 1 tablet by mouth Every Night.     Dispense:  30 tablet     Refill:  1   • divalproex (Depakote) 250 MG DR tablet     Sig: Take 1 tablet by mouth Every Night.     Dispense:  30 tablet     Refill:  1       Return in about 4 weeks (around 12/22/2020), or if symptoms worsen or fail to improve, for Recheck.        Progress toward goal: Not at goal    Functional Status: Severe impairment    Prognosis: Guarded with Ongoing Treatment     Treatment plan completed: 4/28/20.            This document has been electronically signed by JOSUE Antoine  November 24, 2020 13:19 EST    Please note that portions of this note were completed with a voice recognition program. Efforts were made to edit dictation, but occasionally words are mistranscribed.

## 2020-12-22 ENCOUNTER — TELEPHONE (OUTPATIENT)
Dept: PSYCHIATRY | Facility: CLINIC | Age: 20
End: 2020-12-22

## 2020-12-22 DIAGNOSIS — F84.0 AUTISM SPECTRUM DISORDER: ICD-10-CM

## 2020-12-22 DIAGNOSIS — F39 UNSPECIFIED MOOD (AFFECTIVE) DISORDER (HCC): ICD-10-CM

## 2020-12-22 RX ORDER — DIVALPROEX SODIUM 500 MG/1
500 TABLET, DELAYED RELEASE ORAL NIGHTLY
Qty: 30 TABLET | Refills: 0 | Status: SHIPPED | OUTPATIENT
Start: 2020-12-22 | End: 2020-12-29 | Stop reason: SDUPTHER

## 2020-12-23 NOTE — TELEPHONE ENCOUNTER
The staff and nurse Cindy at the [facility] have reached out to this APRN regarding the patient with his continued behaviors and not sleeping at night.  There is inquiry if the patient's medications can be adjusted to help with this.  Please forward the message on to Cindy, the nurse at the [facility], to increase the patient's Depakote to 500 mg by mouth once daily at bedtime.  A prescription for this new dosage has been sent into the patient's pharmacy today.  Thank you.

## 2020-12-29 ENCOUNTER — TELEMEDICINE (OUTPATIENT)
Dept: PSYCHIATRY | Facility: CLINIC | Age: 20
End: 2020-12-29

## 2020-12-29 VITALS
DIASTOLIC BLOOD PRESSURE: 79 MMHG | HEART RATE: 112 BPM | TEMPERATURE: 98.1 F | HEIGHT: 65 IN | WEIGHT: 150 LBS | BODY MASS INDEX: 24.99 KG/M2 | SYSTOLIC BLOOD PRESSURE: 106 MMHG

## 2020-12-29 DIAGNOSIS — F80.2 RECEPTIVE EXPRESSIVE LANGUAGE DISORDER: ICD-10-CM

## 2020-12-29 DIAGNOSIS — Z79.899 HIGH RISK MEDICATION USE: ICD-10-CM

## 2020-12-29 DIAGNOSIS — F39 UNSPECIFIED MOOD (AFFECTIVE) DISORDER (HCC): ICD-10-CM

## 2020-12-29 DIAGNOSIS — F84.0 AUTISM SPECTRUM DISORDER: Primary | ICD-10-CM

## 2020-12-29 DIAGNOSIS — F51.05 INSOMNIA DUE TO MENTAL DISORDER: ICD-10-CM

## 2020-12-29 DIAGNOSIS — F41.9 ANXIETY DISORDER, UNSPECIFIED TYPE: ICD-10-CM

## 2020-12-29 PROCEDURE — 99214 OFFICE O/P EST MOD 30 MIN: CPT | Performed by: NURSE PRACTITIONER

## 2020-12-29 RX ORDER — DIVALPROEX SODIUM 500 MG/1
500 TABLET, DELAYED RELEASE ORAL NIGHTLY
Qty: 30 TABLET | Refills: 0 | Status: SHIPPED | OUTPATIENT
Start: 2020-12-29 | End: 2021-01-26 | Stop reason: SDUPTHER

## 2020-12-29 RX ORDER — QUETIAPINE FUMARATE 300 MG/1
300 TABLET, FILM COATED ORAL 2 TIMES DAILY
Qty: 60 TABLET | Refills: 0 | Status: SHIPPED | OUTPATIENT
Start: 2020-12-29 | End: 2021-01-26 | Stop reason: SDUPTHER

## 2020-12-29 RX ORDER — CHOLECALCIFEROL (VITAMIN D3) 125 MCG
5 CAPSULE ORAL NIGHTLY
Qty: 30 TABLET | Refills: 0 | Status: SHIPPED | OUTPATIENT
Start: 2020-12-29 | End: 2021-01-26 | Stop reason: SDUPTHER

## 2020-12-29 RX ORDER — HYDROXYZINE 50 MG/1
50 TABLET, FILM COATED ORAL
Qty: 30 TABLET | Refills: 0 | Status: SHIPPED | OUTPATIENT
Start: 2020-12-29 | End: 2021-01-26 | Stop reason: SDUPTHER

## 2020-12-29 RX ORDER — DIVALPROEX SODIUM 250 MG/1
250 TABLET, DELAYED RELEASE ORAL EVERY MORNING
Qty: 30 TABLET | Refills: 0 | Status: SHIPPED | OUTPATIENT
Start: 2020-12-29 | End: 2021-01-26 | Stop reason: SDUPTHER

## 2020-12-29 RX ORDER — CLONIDINE HYDROCHLORIDE 0.2 MG/1
TABLET ORAL
Qty: 60 TABLET | Refills: 0 | Status: SHIPPED | OUTPATIENT
Start: 2020-12-29 | End: 2021-01-26 | Stop reason: SDUPTHER

## 2020-12-29 RX ORDER — TRAZODONE HYDROCHLORIDE 150 MG/1
150 TABLET ORAL NIGHTLY
Qty: 30 TABLET | Refills: 0 | Status: SHIPPED | OUTPATIENT
Start: 2020-12-29 | End: 2021-01-26 | Stop reason: SDUPTHER

## 2020-12-29 NOTE — PROGRESS NOTES
This provider is located at the [facility] (through [facility]), [address], [place], [address] using a secure Zoom platform.  The patient is seen remotely at [facility] on the [facility], [address], [place], [place], [address] via a secure Zoom platform provided through [facility] with staff present.  The patient's condition being diagnosed/treated is appropriate for telemedicine. The provider identified herself as well as her credentials.  The patient and/or patient's guardian consent to be seen remotely, and when consent is given they understand that the consent allows for patient identifiable information to be sent to a third party as needed.   They may refuse to be seen remotely at any time. The electronic data is encrypted and password protected, and the patient has been advised of the potential risks to privacy notwithstanding such measures.        Char Butler is a 20 y.o. male who presents today for follow up    Chief Complaint:  Bipolar disorder, autism, impulse-control disorder, disruptive behavior, mixed receptive expressive language disorder    Accompanied by: The patient was being seen with a therapist and a nurse (Cindy) due to the patient's condition, and to assist with sharing of information and facilitation of visit.    History of Present Illness: The staff at the [facility] describe the patient's moods and behaviors as baseline over the last few weeks.  The staff report the patient is hyperactive all the time still and always on the go.  The staff report the patient's appetite as good.  The staff report there is a peer [place] the [place] who has sensory issues, and the patient likes to get in front of this peer and makes sounds to instigate his peer.  The staff report other than this the patient typically does not instigate or become aggressive with his peers.   The staff report if the patient becomes agitated or angry he responds by squeezing such as squeezing their hands or hugging them very tightly.  The staff report the patient's sleep as poor.  The staff report you can hear the patient all night long making noises and bouncing in his bed, and he just doesn't sleep much at all.  The staff deny any new medical problems or changes in medications since last appointment with this facility.  The staff report compliance with the patient's current medication regimen.  The staff deny any current side effects from the patient's current medication regimen.  The staff deny any noticeable abnormal muscle movements or tics, or changes from the patient's baseline.  The staff report the patient opened his Joe presents, and loved his orestes CDs.  The staff would like to adjust the patient's medications at this visit if possible to help with his continued hyperactivity and behaviors.  The staff deny any expressions of auditory hallucinations or visual hallucinations, and the staff deny any expressions of suicidal ideations or homicidal ideations, but it is hard to determine due to the nonverbal status of the patient.        The following portions of the patient's history were reviewed and updated as appropriate: allergies, current medications, past family history, past medical history, past social history, past surgical history and problem list.          Past Medical History:  Past Medical History:   Diagnosis Date   • Autism spectrum disorder    • Bipolar disorder (CMS/HCC)    • Disruptive behavior disorder    • Impulse control disorder    • Mixed receptive-expressive language disorder    • Psychosis (CMS/HCC)        Social History:  Social History     Socioeconomic History   • Marital status: Single     Spouse name: Not on file   • Number of children: Not on file   • Years of education: Not on file   • Highest education level: Not on file   Tobacco Use   • Smoking status: Unknown  If Ever Smoked   Substance and Sexual Activity   • Alcohol use: Not Currently   • Drug use: Not Currently   • Sexual activity: Defer       Family History:  Family History   Family history unknown: Yes       Past Surgical History:  History reviewed. No pertinent surgical history.    Problem List:  There is no problem list on file for this patient.      Allergy:   Allergies   Allergen Reactions   • Cinnamon Unknown (See Comments)     Taken from referral   • Fish-Derived Products Unknown (See Comments)     Taken from referral   • Nutmeg Oil (Myristica Oil) Unknown (See Comments)     Taken from referral   • Tomato Unknown - High Severity     Raw tomatoes        Current Medications:   Current Outpatient Medications   Medication Sig Dispense Refill   • cloNIDine (CATAPRES) 0.2 MG tablet Take 0.5 tablets by mouth Daily With Breakfast & Lunch AND 1 tablet Every Night. 60 tablet 0   • divalproex (Depakote) 250 MG DR tablet Take 1 tablet by mouth Every Morning. 30 tablet 0   • divalproex (DEPAKOTE) 500 MG DR tablet Take 1 tablet by mouth Every Night. 30 tablet 0   • hydrOXYzine (ATARAX) 50 MG tablet Take 1 tablet by mouth every night at bedtime. 30 tablet 0   • melatonin 5 MG tablet tablet Take 1 tablet by mouth Every Night. 30 tablet 0   • QUEtiapine (SEROquel) 300 MG tablet Take 1 tablet by mouth 2 (Two) Times a Day. 60 tablet 0   • traZODone (DESYREL) 150 MG tablet Take 1 tablet by mouth Every Night. 30 tablet 0     No current facility-administered medications for this visit.        Review of Symptoms:    Review of Systems   Constitutional: Negative.    HENT: Negative.    Eyes: Negative.    Respiratory: Negative.    Cardiovascular: Negative.    Gastrointestinal: Negative.    Endocrine: Negative.    Genitourinary: Negative.    Musculoskeletal: Negative.    Skin: Negative.    Neurological: Negative.    Psychiatric/Behavioral: Positive for agitation, behavioral problems, decreased concentration, sleep disturbance, positive  "for hyperactivity and stress. The patient is nervous/anxious.          Physical Exam:   Physical Exam   Neurological: He is alert.   Psychiatric: He is hyperactive. Cognition and memory are impaired. He expresses no homicidal and no suicidal ideation. He is noncommunicative.   Vitals reviewed.        Blood pressure 106/79, pulse 112, temperature 98.1 °F (36.7 °C), height 165.1 cm (65\"), weight 68 kg (150 lb). Body mass index is 24.96 kg/m².      Mental Status Exam:   Hygiene:   good  Cooperation:  Guarded  Eye Contact:  Poor  Psychomotor Behavior:  Hyperactive  Affect:  Restricted  Mood: anxious  Speech:  minimal, he can repeat sounds and some words, no reported change from patient's baseline  Thought Process:  Unable to demonstrate  Thought Content:  Unable to demonstrate  Suicidal:  None demonstrated  Homicidal:  None demonstrated  Hallucinations:  Not demonstrated today   Delusion:  Unable to demonstrate   Orientation:  Unable to evaluate and he will look at you and respond with sounds when you say his name  Reliability:  unable to demonstrate  Insight:  None  Judgement:  Impaired  Impulse Control:  Impaired  Physical/Medical Issues:  Chronic health issues, no acute physical or medical issues today       Lab Results:   No visits with results within 1 Month(s) from this visit.   Latest known visit with results is:   No results found for any previous visit.       Assessment/Plan   Problems Addressed this Visit     None      Visit Diagnoses     Autism spectrum disorder    -  Primary    Relevant Medications    cloNIDine (CATAPRES) 0.2 MG tablet    divalproex (DEPAKOTE) 500 MG DR tablet    hydrOXYzine (ATARAX) 50 MG tablet    QUEtiapine (SEROquel) 300 MG tablet    traZODone (DESYREL) 150 MG tablet    divalproex (Depakote) 250 MG DR tablet    Unspecified mood (affective) disorder (CMS/HCC)        Relevant Medications    divalproex (DEPAKOTE) 500 MG DR tablet    hydrOXYzine (ATARAX) 50 MG tablet    QUEtiapine (SEROquel) " 300 MG tablet    traZODone (DESYREL) 150 MG tablet    divalproex (Depakote) 250 MG DR tablet    Anxiety disorder, unspecified type        Relevant Medications    cloNIDine (CATAPRES) 0.2 MG tablet    hydrOXYzine (ATARAX) 50 MG tablet    QUEtiapine (SEROquel) 300 MG tablet    traZODone (DESYREL) 150 MG tablet    Insomnia due to mental disorder        Relevant Medications    cloNIDine (CATAPRES) 0.2 MG tablet    hydrOXYzine (ATARAX) 50 MG tablet    melatonin 5 MG tablet tablet    QUEtiapine (SEROquel) 300 MG tablet    traZODone (DESYREL) 150 MG tablet    Receptive expressive language disorder        High risk medication use        Relevant Orders    CBC & Differential    Comprehensive Metabolic Panel    Lipid Panel    Valproic Acid Level, Total    TSH    T4, free      Diagnoses       Codes Comments    Autism spectrum disorder    -  Primary ICD-10-CM: F84.0  ICD-9-CM: 299.00     Unspecified mood (affective) disorder (CMS/HCC)     ICD-10-CM: F39  ICD-9-CM: 296.90     Anxiety disorder, unspecified type     ICD-10-CM: F41.9  ICD-9-CM: 300.00     Insomnia due to mental disorder     ICD-10-CM: F51.05  ICD-9-CM: 300.9, 327.02     Receptive expressive language disorder     ICD-10-CM: F80.2  ICD-9-CM: 315.32     High risk medication use     ICD-10-CM: Z79.899  ICD-9-CM: V58.69           Visit Diagnoses:    ICD-10-CM ICD-9-CM   1. Autism spectrum disorder  F84.0 299.00   2. Unspecified mood (affective) disorder (CMS/HCC)  F39 296.90   3. Anxiety disorder, unspecified type  F41.9 300.00   4. Insomnia due to mental disorder  F51.05 300.9     327.02   5. Receptive expressive language disorder  F80.2 315.32   6. High risk medication use  Z79.899 V58.69         GOALS:  Short Term Goals: Patient will be compliant with medication, and patient will have no significant medication related side effects.  Patient will be engaged in psychotherapy as indicated.  Patient will report subjective improvement of symptoms.  Long term goals: To  stabilize mood and treat/improve subjective symptoms, the patient will stay out of the hospital, the patient will be at an optimal level of functioning, and the patient will take all medications as prescribed.  The patient's nurse and therapist/guardians verbalized understanding and agreement with goals that were mutually set.      TREATMENT PLAN: Continue supportive psychotherapy efforts and medications as indicated.  Continue clonidine 0.1 mg by mouth twice daily for behaviors, take once in the morning and once at noon.  Continue clonidine 0.2 mg to take by mouth, for behaviors and sleep, once daily at bedtime.  Continue Seroquel 300 mg by mouth twice daily for mood, behaviors, and also sleep.  Continue hydroxyzine 50 mg by mouth once daily at bedtime for insomnia/sleep and anxiety.  Continue melatonin 5 mg by mouth once daily at bedtime for sleep.  Increase trazodone to 150 mg by mouth once daily at bedtime for sleep.  Increase Depakote to 250 mg by mouth once daily in the morning and 500 mg once daily at bedtime for mood and behaviors.  Pharmacological and Non-Pharmacological treatment options discussed during today's visit, including off label use of medications. Patient's caretakers/guardians acknowledged and verbally consented with current treatment plan and was educated on the importance of compliance with treatment and follow-up appointments.          MEDICATION ISSUES:  Discussed medication options and treatment plan of prescribed medication, any off label use of medication, as well as the risks, benefits, any black box warnings including increased suicidality, and side effects including but not limited to potential falls, dizziness, possible impaired driving, GI side effects (change in appetite, abdominal discomfort, nausea, vomiting, diarrhea, and/or constipation), dry mouth, somnolence, sedation, insomnia, activation, agitation, irritation, tremors, abnormal muscle movements or disorders, tardive  dyskinesia, akathisia, asthenia, headache, sweating, possible bruising or rare bleeding, electrolyte and/or fluid abnormalities, change in blood pressure/heart rate/and or heart rhythm, hypotension, sexual dysfunction, rare impulse control problems, rare seizures, rare neuroleptic malignant syndrome, increased risk of death and cerebrovascular events, change in blood glucose and increased risk for diabetes, change in triglycerides and cholesterol and increased risk for dyslipidemia,  weight gain, weight gain that can become problematic to health, skin conditions and reactions, and metabolic adversities among others. Patient's caretakers/guardians are agreeable to call the office with any worsening of symptoms or onset of side effects, or if any concerns or questions arise.  The contact information for the office is made available to the patient's guardians/caretakers. Patient's caretakers/guardians are agreeable to call 911 or go to the nearest ER should they begin having any SI/HI, or if any urgent concerns arise. No medication side effects or related complaints today.         SAFETY PLAN:  Patient/guardian was encouraged to call the clinic with any questions or concerns.  Patient/guardian was informed of access to emergency care. If patient were to develop any significant symptomatology, suicidal ideation, homicidal ideation, any concerns, or feel unsafe at any time they are to call the clinic and if unable to get immediate assistance should immediately call 911 or go to the nearest emergency room.  The Guardian is advised to remove or secure (lock away) all lethal weapons (including firearms/guns) and sharps (including razors, scissors, knives, sharps, objects to start fires, etc.).  All medications (including any prescribed and any over the counter medications) should be stored in a safe and secured/locked location that is not obtainable by children/adolescents, or the patient.  The guardian should administer  all medications as indicated, prescribed and OTC, to the patient for safety and compliance.  The guardian/caretakers verbalized understanding and agreed to comply with the safety plan discussed.   Patient/guardian was given an opportunity and encouraged to ask questions about the medication, illness, and treatment. Patient/guardian contracted verbally for the following: If you are experiencing an emotional crisis or have thoughts of harming yourself or others, please go to your nearest local emergency room or call 911. Will continue to re-assess medication response and side effects frequently to establish efficacy and ensure safety. Treatment risks, adverse effects, any black box warnings, side effects, off label usage, alternative treatment options, and benefits of medication and treatment discussed with patient and guardian.  Patient/guardian verbalized understanding in their own words. The patient/guardian verbalized understanding and agreed to comply with the safety plan discussed in their own words.  Patient/guardian given the number to the office. Number also available to the 24- hour suicide hotline.        MEDS ORDERED DURING VISIT:  New Medications Ordered This Visit   Medications   • cloNIDine (CATAPRES) 0.2 MG tablet     Sig: Take 0.5 tablets by mouth Daily With Breakfast & Lunch AND 1 tablet Every Night.     Dispense:  60 tablet     Refill:  0   • divalproex (DEPAKOTE) 500 MG DR tablet     Sig: Take 1 tablet by mouth Every Night.     Dispense:  30 tablet     Refill:  0   • hydrOXYzine (ATARAX) 50 MG tablet     Sig: Take 1 tablet by mouth every night at bedtime.     Dispense:  30 tablet     Refill:  0   • melatonin 5 MG tablet tablet     Sig: Take 1 tablet by mouth Every Night.     Dispense:  30 tablet     Refill:  0   • QUEtiapine (SEROquel) 300 MG tablet     Sig: Take 1 tablet by mouth 2 (Two) Times a Day.     Dispense:  60 tablet     Refill:  0   • traZODone (DESYREL) 150 MG tablet     Sig: Take 1  tablet by mouth Every Night.     Dispense:  30 tablet     Refill:  0   • divalproex (Depakote) 250 MG DR tablet     Sig: Take 1 tablet by mouth Every Morning.     Dispense:  30 tablet     Refill:  0       Return in about 4 weeks (around 1/26/2021), or if symptoms worsen or fail to improve, for Recheck.       Progress toward goal: Not at goal    Functional Status: Severe impairment    Prognosis: Guarded with Ongoing Treatment     Treatment plan completed: 4/28/20.            This document has been electronically signed by JOSUE Antoine  December 29, 2020 11:41 EST    Please note that portions of this note were completed with a voice recognition program. Efforts were made to edit dictation, but occasionally words are mistranscribed.

## 2021-01-26 ENCOUNTER — TELEMEDICINE (OUTPATIENT)
Dept: PSYCHIATRY | Facility: CLINIC | Age: 21
End: 2021-01-26

## 2021-01-26 DIAGNOSIS — F80.2 RECEPTIVE EXPRESSIVE LANGUAGE DISORDER: Chronic | ICD-10-CM

## 2021-01-26 DIAGNOSIS — F51.05 INSOMNIA DUE TO MENTAL DISORDER: ICD-10-CM

## 2021-01-26 DIAGNOSIS — F39 UNSPECIFIED MOOD (AFFECTIVE) DISORDER (HCC): ICD-10-CM

## 2021-01-26 DIAGNOSIS — F84.0 AUTISM SPECTRUM DISORDER: Primary | Chronic | ICD-10-CM

## 2021-01-26 DIAGNOSIS — F41.9 ANXIETY DISORDER, UNSPECIFIED TYPE: Chronic | ICD-10-CM

## 2021-01-26 PROCEDURE — 99214 OFFICE O/P EST MOD 30 MIN: CPT | Performed by: NURSE PRACTITIONER

## 2021-01-26 RX ORDER — DIVALPROEX SODIUM 250 MG/1
250 TABLET, DELAYED RELEASE ORAL EVERY MORNING
Qty: 30 TABLET | Refills: 0 | Status: SHIPPED | OUTPATIENT
Start: 2021-01-26 | End: 2021-02-23 | Stop reason: SDUPTHER

## 2021-01-26 RX ORDER — CHOLECALCIFEROL (VITAMIN D3) 125 MCG
5 CAPSULE ORAL NIGHTLY
Qty: 30 TABLET | Refills: 0 | Status: SHIPPED | OUTPATIENT
Start: 2021-01-26 | End: 2021-02-23 | Stop reason: SDUPTHER

## 2021-01-26 RX ORDER — CLONIDINE HYDROCHLORIDE 0.2 MG/1
TABLET ORAL
Qty: 60 TABLET | Refills: 0 | Status: SHIPPED | OUTPATIENT
Start: 2021-01-26 | End: 2021-02-23 | Stop reason: SDUPTHER

## 2021-01-26 RX ORDER — HYDROXYZINE 50 MG/1
100 TABLET, FILM COATED ORAL
Qty: 60 TABLET | Refills: 0 | Status: SHIPPED | OUTPATIENT
Start: 2021-01-26 | End: 2021-02-23 | Stop reason: SDUPTHER

## 2021-01-26 RX ORDER — DIVALPROEX SODIUM 500 MG/1
500 TABLET, DELAYED RELEASE ORAL NIGHTLY
Qty: 30 TABLET | Refills: 0 | Status: SHIPPED | OUTPATIENT
Start: 2021-01-26 | End: 2021-02-23 | Stop reason: SDUPTHER

## 2021-01-26 RX ORDER — TRAZODONE HYDROCHLORIDE 150 MG/1
150 TABLET ORAL NIGHTLY
Qty: 30 TABLET | Refills: 0 | Status: SHIPPED | OUTPATIENT
Start: 2021-01-26 | End: 2021-02-23 | Stop reason: SDUPTHER

## 2021-01-26 RX ORDER — QUETIAPINE FUMARATE 300 MG/1
300 TABLET, FILM COATED ORAL 2 TIMES DAILY
Qty: 60 TABLET | Refills: 0 | Status: SHIPPED | OUTPATIENT
Start: 2021-01-26 | End: 2021-02-23 | Stop reason: SDUPTHER

## 2021-01-26 NOTE — PROGRESS NOTES
"This provider is located at the [facility] (through [facility]), [address], [place], [address] using a secure Zoom platform.  The patient is seen remotely at [facility] on the [facility], [address], [place], [address] via a secure Zoom platform provided through [facility] with staff present.  The patient's condition being diagnosed/treated is appropriate for telemedicine. The provider identified herself as well as her credentials.  The patient and/or patient's guardian consent to be seen remotely, and when consent is given they understand that the consent allows for patient identifiable information to be sent to a third party as needed.   They may refuse to be seen remotely at any time. The electronic data is encrypted and password protected, and the patient has been advised of the potential risks to privacy notwithstanding such measures.        Char Butler is a 20 y.o. male who presents today for follow up    Chief Complaint:  Bipolar disorder, autism, impulse-control disorder, disruptive behavior, mixed receptive expressive language disorder    Accompanied by: The patient was being seen with a therapist and a nurse (Cindy) due to the patient's condition, and to assist with sharing of information and facilitation of visit.    History of Present Illness:   The staff at the [facility] describe the patient's moods and behaviors as \"wild as ever\" over the last few weeks.  The staff report he is still hyperactive.  He has not been in any holds.  They report when he is upset he will squeeze his hands and jump up and down, but doesn't try to hurt anyone else or himself.  The staff report the patient's appetite as good.  The staff report the patient's sleep as poor.  The staff report he is up and down all night, and you can hear him all over the building making sounds and bouncing in " his bed.  The staff deny any new medical problems or changes in medications since last appointment with this facility.  The nursing staff report the patient recently had labs obtained and his WBC count was low so they were repeated yesterday, and are still waiting on the results.  The staff report compliance with the patient's current medication regimen.  The staff deny any current side effects from the patient's current medication regimen.  The staff deny any noticeable abnormal muscle movements or tics, or changes from the patient's baseline.  The staff report the patient has been doing okay with his classes, but it is hard to keep him still to do his work.  The staff report he seems to instigate his peers, but they don't think he knows what he is doing.  They report he doesn't typically interact with his peers much, but will interact with staff some, but not even a whole lot there.  The staff would like to adjust the patient's medications at this visit to help with sleep.  The staff report they feel if he could sleep better he would do better in the day.  The staff deny any expressions of auditory hallucinations or visual hallucinations, and the staff deny any expressions of suicidal ideations or homicidal ideations, but it is hard to determine due to the nonverbal status of the patient.        The following portions of the patient's history were reviewed and updated as appropriate: allergies, current medications, past family history, past medical history, past social history, past surgical history and problem list.          Past Medical History:  Past Medical History:   Diagnosis Date   • Autism spectrum disorder    • Bipolar disorder (CMS/HCC)    • Disruptive behavior disorder    • Impulse control disorder    • Mixed receptive-expressive language disorder    • Psychosis (CMS/HCC)        Social History:  Social History     Socioeconomic History   • Marital status: Single     Spouse name: Not on file   • Number  of children: Not on file   • Years of education: Not on file   • Highest education level: Not on file   Tobacco Use   • Smoking status: Never Smoker   • Smokeless tobacco: Never Used   Substance and Sexual Activity   • Alcohol use: Never     Frequency: Never   • Drug use: Never   • Sexual activity: Defer       Family History:  Family History   Family history unknown: Yes       Past Surgical History:  History reviewed. No pertinent surgical history.    Problem List:  There is no problem list on file for this patient.      Allergy:   Allergies   Allergen Reactions   • Cinnamon Unknown (See Comments)     Taken from referral   • Fish-Derived Products Unknown (See Comments)     Taken from referral   • Nutmeg Oil (Myristica Oil) Unknown (See Comments)     Taken from referral   • Tomato Unknown - High Severity     Raw tomatoes        Current Medications:   Current Outpatient Medications   Medication Sig Dispense Refill   • cloNIDine (CATAPRES) 0.2 MG tablet Take 0.5 tablets by mouth Daily With Breakfast & Lunch AND 1 tablet Every Night. 60 tablet 0   • divalproex (Depakote) 250 MG DR tablet Take 1 tablet by mouth Every Morning. 30 tablet 0   • divalproex (DEPAKOTE) 500 MG DR tablet Take 1 tablet by mouth Every Night. 30 tablet 0   • hydrOXYzine (ATARAX) 50 MG tablet Take 2 tablets by mouth every night at bedtime. 60 tablet 0   • melatonin 5 MG tablet tablet Take 1 tablet by mouth Every Night. 30 tablet 0   • QUEtiapine (SEROquel) 300 MG tablet Take 1 tablet by mouth 2 (Two) Times a Day. 60 tablet 0   • traZODone (DESYREL) 150 MG tablet Take 1 tablet by mouth Every Night. 30 tablet 0     No current facility-administered medications for this visit.        Review of Symptoms:    Review of Systems   Constitutional: Negative.    HENT: Negative.    Eyes: Negative.    Respiratory: Negative.    Cardiovascular: Negative.    Gastrointestinal: Negative.    Endocrine: Negative.    Genitourinary: Negative.    Musculoskeletal: Negative.     Skin: Negative.    Neurological: Negative.    Psychiatric/Behavioral: Positive for agitation, behavioral problems, decreased concentration, sleep disturbance, positive for hyperactivity and stress. The patient is nervous/anxious.          Physical Exam:   Physical Exam   Neurological: He is alert.   Psychiatric: He is hyperactive. Cognition and memory are impaired. He expresses no homicidal and no suicidal ideation. He is noncommunicative.   Vitals reviewed.        There were no vitals taken for this visit. There is no height or weight on file to calculate BMI.  The staff were unable to obtain any further vital signs at today's visit due to automatic blood pressure cuff would not read.  Ht: 65 inches.  Weight 149 pounds.      Mental Status Exam:   Hygiene:   good  Cooperation:  Guarded  Eye Contact:  Poor  Psychomotor Behavior:  Hyperactive  Affect:  Restricted  Mood: anxious  Speech:  minimal, he can repeat sounds and some words, no reported change from patient's baseline  Thought Process:  Unable to demonstrate  Thought Content:  Unable to demonstrate  Suicidal:  None demonstrated  Homicidal:  None demonstrated  Hallucinations:  Not demonstrated today   Delusion:  Unable to demonstrate   Orientation:  Unable to evaluate and he will look at you and respond with sounds when you say his name  Reliability:  unable to demonstrate  Insight:  None  Judgement:  Impaired  Impulse Control:  Impaired  Physical/Medical Issues:  Chronic health issues, no acute physical or medical issues today       Lab Results:   No visits with results within 1 Month(s) from this visit.   Latest known visit with results is:   No results found for any previous visit.     Patient's labs resulted 1/21/2021 reviewed by this APRN at today's encounter and am currently awaiting repeat CBC results.      Assessment/Plan   Problems Addressed this Visit     None      Visit Diagnoses     Autism spectrum disorder  (Chronic)   -  Primary    Relevant  Medications    cloNIDine (CATAPRES) 0.2 MG tablet    divalproex (Depakote) 250 MG DR tablet    divalproex (DEPAKOTE) 500 MG DR tablet    hydrOXYzine (ATARAX) 50 MG tablet    QUEtiapine (SEROquel) 300 MG tablet    traZODone (DESYREL) 150 MG tablet    Unspecified mood (affective) disorder (CMS/HCC)        Relevant Medications    divalproex (Depakote) 250 MG DR tablet    divalproex (DEPAKOTE) 500 MG DR tablet    hydrOXYzine (ATARAX) 50 MG tablet    QUEtiapine (SEROquel) 300 MG tablet    traZODone (DESYREL) 150 MG tablet    Anxiety disorder, unspecified type  (Chronic)       Relevant Medications    cloNIDine (CATAPRES) 0.2 MG tablet    hydrOXYzine (ATARAX) 50 MG tablet    QUEtiapine (SEROquel) 300 MG tablet    traZODone (DESYREL) 150 MG tablet    Insomnia due to mental disorder        Relevant Medications    cloNIDine (CATAPRES) 0.2 MG tablet    hydrOXYzine (ATARAX) 50 MG tablet    melatonin 5 MG tablet tablet    QUEtiapine (SEROquel) 300 MG tablet    traZODone (DESYREL) 150 MG tablet    Receptive expressive language disorder  (Chronic)         Diagnoses       Codes Comments    Autism spectrum disorder    -  Primary ICD-10-CM: F84.0  ICD-9-CM: 299.00     Unspecified mood (affective) disorder (CMS/HCC)     ICD-10-CM: F39  ICD-9-CM: 296.90     Anxiety disorder, unspecified type     ICD-10-CM: F41.9  ICD-9-CM: 300.00     Insomnia due to mental disorder     ICD-10-CM: F51.05  ICD-9-CM: 300.9, 327.02     Receptive expressive language disorder     ICD-10-CM: F80.2  ICD-9-CM: 315.32           Visit Diagnoses:    ICD-10-CM ICD-9-CM   1. Autism spectrum disorder  F84.0 299.00   2. Unspecified mood (affective) disorder (CMS/HCC)  F39 296.90   3. Anxiety disorder, unspecified type  F41.9 300.00   4. Insomnia due to mental disorder  F51.05 300.9     327.02   5. Receptive expressive language disorder  F80.2 315.32         GOALS:  Short Term Goals: Patient will be compliant with medication, and patient will have no significant  medication related side effects.  Patient will be engaged in psychotherapy as indicated.  Patient will report subjective improvement of symptoms.  Long term goals: To stabilize mood and treat/improve subjective symptoms, the patient will stay out of the hospital, the patient will be at an optimal level of functioning, and the patient will take all medications as prescribed.  The patient's nurse and therapist/guardians verbalized understanding and agreement with goals that were mutually set.      TREATMENT PLAN: Continue supportive psychotherapy efforts and medications as indicated.  Continue clonidine 0.1 mg by mouth twice daily for behaviors, take once in the morning and once at noon.  Continue clonidine 0.2 mg to take by mouth, for behaviors and sleep, once daily at bedtime.  Continue Seroquel 300 mg by mouth twice daily for mood, behaviors, and also sleep.  Increase hydroxyzine to 100 mg by mouth once daily at bedtime for insomnia/sleep and anxiety.  Continue melatonin 5 mg by mouth once daily at bedtime for sleep.  Continue trazodone 150 mg by mouth once daily at bedtime for sleep.  Continue Depakote 250 mg by mouth once daily in the morning and 500 mg once daily at bedtime for mood and behaviors.  Pharmacological and Non-Pharmacological treatment options discussed during today's visit, including off label use of medications. Patient's caretakers/guardians acknowledged and verbally consented with current treatment plan and was educated on the importance of compliance with treatment and follow-up appointments.          MEDICATION ISSUES:  Discussed medication options and treatment plan of prescribed medication, any off label use of medication, as well as the risks, benefits, any black box warnings including increased suicidality, and side effects including but not limited to potential falls, dizziness, possible impaired driving, GI side effects (change in appetite, abdominal discomfort, nausea, vomiting,  diarrhea, and/or constipation), dry mouth, somnolence, sedation, insomnia, activation, agitation, irritation, tremors, abnormal muscle movements or disorders, tardive dyskinesia, akathisia, asthenia, headache, sweating, possible bruising or rare bleeding, electrolyte and/or fluid abnormalities, change in blood pressure/heart rate/and or heart rhythm, hypotension, sexual dysfunction, rare impulse control problems, rare seizures, rare neuroleptic malignant syndrome, increased risk of death and cerebrovascular events, change in blood glucose and increased risk for diabetes, change in triglycerides and cholesterol and increased risk for dyslipidemia,  weight gain, weight gain that can become problematic to health, skin conditions and reactions, and metabolic adversities among others. Patient's caretakers/guardians are agreeable to call the office with any worsening of symptoms or onset of side effects, or if any concerns or questions arise.  The contact information for the office is made available to the patient's guardians/caretakers. Patient's caretakers/guardians are agreeable to call 911 or go to the nearest ER should they begin having any SI/HI, or if any urgent concerns arise. No medication side effects or related complaints today.         SAFETY PLAN:  Patient/guardian was encouraged to call the clinic with any questions or concerns.  Patient/guardian was informed of access to emergency care. If patient were to develop any significant symptomatology, suicidal ideation, homicidal ideation, any concerns, or feel unsafe at any time they are to call the clinic and if unable to get immediate assistance should immediately call 911 or go to the nearest emergency room.  The Guardian is advised to remove or secure (lock away) all lethal weapons (including firearms/guns) and sharps (including razors, scissors, knives, sharps, objects to start fires, etc.).  All medications (including any prescribed and any over the counter  medications) should be stored in a safe and secured/locked location that is not obtainable by children/adolescents, or the patient.  The guardian should administer all medications as indicated, prescribed and OTC, to the patient for safety and compliance.  The guardian/caretakers verbalized understanding and agreed to comply with the safety plan discussed.   Patient/guardian was given an opportunity and encouraged to ask questions about the medication, illness, and treatment. Patient/guardian contracted verbally for the following: If you are experiencing an emotional crisis or have thoughts of harming yourself or others, please go to your nearest local emergency room or call 911. Will continue to re-assess medication response and side effects frequently to establish efficacy and ensure safety. Treatment risks, adverse effects, any black box warnings, side effects, off label usage, alternative treatment options, and benefits of medication and treatment discussed with patient and guardian.  Patient/guardian verbalized understanding in their own words. The patient/guardian verbalized understanding and agreed to comply with the safety plan discussed in their own words.  Patient/guardian given the number to the office. Number also available to the 24- hour suicide hotline.        MEDS ORDERED DURING VISIT:  New Medications Ordered This Visit   Medications   • cloNIDine (CATAPRES) 0.2 MG tablet     Sig: Take 0.5 tablets by mouth Daily With Breakfast & Lunch AND 1 tablet Every Night.     Dispense:  60 tablet     Refill:  0   • divalproex (Depakote) 250 MG DR tablet     Sig: Take 1 tablet by mouth Every Morning.     Dispense:  30 tablet     Refill:  0   • divalproex (DEPAKOTE) 500 MG DR tablet     Sig: Take 1 tablet by mouth Every Night.     Dispense:  30 tablet     Refill:  0   • hydrOXYzine (ATARAX) 50 MG tablet     Sig: Take 2 tablets by mouth every night at bedtime.     Dispense:  60 tablet     Refill:  0   • melatonin  5 MG tablet tablet     Sig: Take 1 tablet by mouth Every Night.     Dispense:  30 tablet     Refill:  0   • QUEtiapine (SEROquel) 300 MG tablet     Sig: Take 1 tablet by mouth 2 (Two) Times a Day.     Dispense:  60 tablet     Refill:  0   • traZODone (DESYREL) 150 MG tablet     Sig: Take 1 tablet by mouth Every Night.     Dispense:  30 tablet     Refill:  0       Return in about 4 weeks (around 2/23/2021), or if symptoms worsen or fail to improve, for Recheck.       Progress toward goal: Not at goal    Functional Status: Severe impairment    Prognosis: Guarded with Ongoing Treatment     Treatment plan completed: 4/28/20.            This document has been electronically signed by JOSUE Antoine  January 26, 2021 12:17 EST    Please note that portions of this note were completed with a voice recognition program. Efforts were made to edit dictation, but occasionally words are mistranscribed.

## 2021-02-23 ENCOUNTER — TELEMEDICINE (OUTPATIENT)
Dept: PSYCHIATRY | Facility: CLINIC | Age: 21
End: 2021-02-23

## 2021-02-23 VITALS
WEIGHT: 155 LBS | HEART RATE: 89 BPM | DIASTOLIC BLOOD PRESSURE: 70 MMHG | TEMPERATURE: 97.9 F | HEIGHT: 65 IN | SYSTOLIC BLOOD PRESSURE: 110 MMHG | BODY MASS INDEX: 25.83 KG/M2

## 2021-02-23 DIAGNOSIS — F84.0 AUTISM SPECTRUM DISORDER: Chronic | ICD-10-CM

## 2021-02-23 DIAGNOSIS — F39 UNSPECIFIED MOOD (AFFECTIVE) DISORDER (HCC): ICD-10-CM

## 2021-02-23 DIAGNOSIS — F41.9 ANXIETY DISORDER, UNSPECIFIED TYPE: Chronic | ICD-10-CM

## 2021-02-23 DIAGNOSIS — F51.05 INSOMNIA DUE TO MENTAL DISORDER: ICD-10-CM

## 2021-02-23 PROCEDURE — 99214 OFFICE O/P EST MOD 30 MIN: CPT | Performed by: NURSE PRACTITIONER

## 2021-02-23 RX ORDER — CLONIDINE HYDROCHLORIDE 0.2 MG/1
TABLET ORAL
Qty: 60 TABLET | Refills: 0 | Status: SHIPPED | OUTPATIENT
Start: 2021-02-23 | End: 2021-03-23 | Stop reason: SDUPTHER

## 2021-02-23 RX ORDER — QUETIAPINE FUMARATE 300 MG/1
300 TABLET, FILM COATED ORAL 2 TIMES DAILY
Qty: 60 TABLET | Refills: 0 | Status: SHIPPED | OUTPATIENT
Start: 2021-02-23 | End: 2021-03-23 | Stop reason: SDUPTHER

## 2021-02-23 RX ORDER — HYDROXYZINE 50 MG/1
100 TABLET, FILM COATED ORAL
Qty: 60 TABLET | Refills: 0 | Status: SHIPPED | OUTPATIENT
Start: 2021-02-23 | End: 2021-03-23 | Stop reason: SDUPTHER

## 2021-02-23 RX ORDER — CHOLECALCIFEROL (VITAMIN D3) 125 MCG
5 CAPSULE ORAL NIGHTLY
Qty: 30 TABLET | Refills: 0 | Status: SHIPPED | OUTPATIENT
Start: 2021-02-23 | End: 2021-03-23 | Stop reason: SDUPTHER

## 2021-02-23 RX ORDER — DIVALPROEX SODIUM 250 MG/1
250 TABLET, DELAYED RELEASE ORAL EVERY MORNING
Qty: 30 TABLET | Refills: 0 | Status: SHIPPED | OUTPATIENT
Start: 2021-02-23 | End: 2021-03-23

## 2021-02-23 RX ORDER — DIVALPROEX SODIUM 500 MG/1
500 TABLET, DELAYED RELEASE ORAL NIGHTLY
Qty: 30 TABLET | Refills: 0 | Status: SHIPPED | OUTPATIENT
Start: 2021-02-23 | End: 2021-03-23 | Stop reason: SDUPTHER

## 2021-02-23 RX ORDER — TRAZODONE HYDROCHLORIDE 150 MG/1
150 TABLET ORAL NIGHTLY
Qty: 30 TABLET | Refills: 0 | Status: SHIPPED | OUTPATIENT
Start: 2021-02-23 | End: 2021-03-23 | Stop reason: SDUPTHER

## 2021-02-23 NOTE — PROGRESS NOTES
This provider is located at the [facility] (through [facility]), [address], [place], [address] using a secure Zoom platform.  The patient is seen remotely at [facility] on the [facility], [address], [place], [address] via a secure Zoom platform provided through [facility] with staff present.  The patient's condition being diagnosed/treated is appropriate for telemedicine. The provider identified herself as well as her credentials.  The patient and/or patient's guardian consent to be seen remotely, and when consent is given they understand that the consent allows for patient identifiable information to be sent to a third party as needed.   They may refuse to be seen remotely at any time. The electronic data is encrypted and password protected, and the patient has been advised of the potential risks to privacy notwithstanding such measures.        Char Butler is a 20 y.o. male who presents today for follow up    Chief Complaint:  Bipolar disorder, autism, impulse-control disorder, disruptive behavior, mixed receptive expressive language disorder    Accompanied by: The patient was being seen with staff and a nurse (Cindy) due to the patient's condition, and to assist with sharing of information and facilitation of visit.    History of Present Illness:   The staff at the [facility] describe the patient's moods and behaviors as about the same and his baseline over the last few weeks.  The staff report the patient instigates some of his peers by being loud and bouncing a lot.  They report they don't think he knows what instigation is, but he does look for one of his peers to see if he is present, and if this peer is present he will run to him and jump and be loud in front of this peer, and when this peer gets upset Shaun will laugh.  The staff report they feel Shaun just thinks it  "is funny, not that he is intentionally trying to be mean.  The staff report the patient's appetite as good.  The staff report the patient's sleep as poor.  They report he typically will only sleep for a couple of hours.  The staff report they have found the less sleep he gets the more \"wound up\" he will be the next day.  The staff deny any new medical problems or changes in medications since last appointment with this facility.  The staff report compliance with the patient's current medication regimen.  The staff deny any current side effects from the patient's current medication regimen.  The staff deny any noticeable abnormal muscle movements or tics, or changes from the patient's baseline.  The staff report the patient has been doing poor with his schoolwork, as he is too distracted and unable to be still to do anything.  The staff would like to not adjust or change the patient's medications at this visit as he seems to be at his typical baseline.  The staff deny any expressions of auditory hallucinations or visual hallucinations, and the staff deny any expressions of suicidal ideations or homicidal ideations, but it is hard to determine due to the nonverbal status of the patient.        The following portions of the patient's history were reviewed and updated as appropriate: allergies, current medications, past family history, past medical history, past social history, past surgical history and problem list.          Past Medical History:  Past Medical History:   Diagnosis Date   • Autism spectrum disorder    • Bipolar disorder (CMS/HCC)    • Disruptive behavior disorder    • Impulse control disorder    • Mixed receptive-expressive language disorder    • Psychosis (CMS/HCC)        Social History:  Social History     Socioeconomic History   • Marital status: Single     Spouse name: Not on file   • Number of children: Not on file   • Years of education: Not on file   • Highest education level: Not on file "   Tobacco Use   • Smoking status: Never Smoker   • Smokeless tobacco: Never Used   Substance and Sexual Activity   • Alcohol use: Never     Frequency: Never   • Drug use: Never   • Sexual activity: Defer       Family History:  Family History   Family history unknown: Yes       Past Surgical History:  History reviewed. No pertinent surgical history.    Problem List:  There is no problem list on file for this patient.      Allergy:   Allergies   Allergen Reactions   • Cinnamon Unknown (See Comments)     Taken from referral   • Fish-Derived Products Unknown (See Comments)     Taken from referral   • Nutmeg Oil (Myristica Oil) Unknown (See Comments)     Taken from referral   • Tomato Unknown - High Severity     Raw tomatoes        Current Medications:   Current Outpatient Medications   Medication Sig Dispense Refill   • cloNIDine (CATAPRES) 0.2 MG tablet Take 0.5 tablets by mouth Daily With Breakfast & Lunch AND 1 tablet Every Night. 60 tablet 0   • divalproex (Depakote) 250 MG DR tablet Take 1 tablet by mouth Every Morning. 30 tablet 0   • divalproex (DEPAKOTE) 500 MG DR tablet Take 1 tablet by mouth Every Night. 30 tablet 0   • hydrOXYzine (ATARAX) 50 MG tablet Take 2 tablets by mouth every night at bedtime. 60 tablet 0   • melatonin 5 MG tablet tablet Take 1 tablet by mouth Every Night. 30 tablet 0   • QUEtiapine (SEROquel) 300 MG tablet Take 1 tablet by mouth 2 (Two) Times a Day. 60 tablet 0   • traZODone (DESYREL) 150 MG tablet Take 1 tablet by mouth Every Night. 30 tablet 0     No current facility-administered medications for this visit.        Review of Symptoms:    Review of Systems   Constitutional: Negative.    HENT: Negative.    Eyes: Negative.    Respiratory: Negative.    Cardiovascular: Negative.    Gastrointestinal: Negative.    Endocrine: Negative.    Genitourinary: Negative.    Musculoskeletal: Negative.    Skin: Negative.    Neurological: Negative.    Psychiatric/Behavioral: Positive for agitation,  "behavioral problems, decreased concentration, sleep disturbance, positive for hyperactivity and stress. The patient is nervous/anxious.          Physical Exam:   Physical Exam   Neurological: He is alert.   Psychiatric: He is hyperactive. Cognition and memory are impaired. He expresses no homicidal and no suicidal ideation. He is noncommunicative.   Vitals reviewed.        Blood pressure 110/70, pulse 89, temperature 97.9 °F (36.6 °C), height 165.1 cm (65\"), weight 70.3 kg (155 lb). Body mass index is 25.79 kg/m².        Mental Status Exam:   Hygiene:   good  Cooperation:  Guarded  Eye Contact:  Poor  Psychomotor Behavior:  Hyperactive  Affect:  Restricted  Mood: anxious  Speech:  minimal, he can repeat sounds and some words, no reported change from patient's baseline  Thought Process:  Unable to demonstrate  Thought Content:  Unable to demonstrate  Suicidal:  None demonstrated  Homicidal:  None demonstrated  Hallucinations:  Not demonstrated today   Delusion:  Unable to demonstrate   Orientation:  Unable to evaluate and he will look at you and respond with sounds when you say his name  Reliability:  unable to demonstrate  Insight:  None  Judgement:  Impaired  Impulse Control:  Impaired  Physical/Medical Issues:  Chronic health issues, no acute physical or medical issues today       Lab Results:   No visits with results within 1 Month(s) from this visit.   Latest known visit with results is:   No results found for any previous visit.       Patient's labs (repeat CBC) resulted 1/27/2021 reviewed by this APRN at today's encounter.          Assessment/Plan   Problems Addressed this Visit     None      Visit Diagnoses     Autism spectrum disorder  (Chronic)       Relevant Medications    cloNIDine (CATAPRES) 0.2 MG tablet    divalproex (Depakote) 250 MG DR tablet    divalproex (DEPAKOTE) 500 MG DR tablet    hydrOXYzine (ATARAX) 50 MG tablet    QUEtiapine (SEROquel) 300 MG tablet    traZODone (DESYREL) 150 MG tablet    " Insomnia due to mental disorder        Relevant Medications    cloNIDine (CATAPRES) 0.2 MG tablet    hydrOXYzine (ATARAX) 50 MG tablet    melatonin 5 MG tablet tablet    QUEtiapine (SEROquel) 300 MG tablet    traZODone (DESYREL) 150 MG tablet    Anxiety disorder, unspecified type  (Chronic)       Relevant Medications    cloNIDine (CATAPRES) 0.2 MG tablet    hydrOXYzine (ATARAX) 50 MG tablet    QUEtiapine (SEROquel) 300 MG tablet    traZODone (DESYREL) 150 MG tablet    Unspecified mood (affective) disorder (CMS/HCC)        Relevant Medications    divalproex (Depakote) 250 MG DR tablet    divalproex (DEPAKOTE) 500 MG DR tablet    hydrOXYzine (ATARAX) 50 MG tablet    QUEtiapine (SEROquel) 300 MG tablet    traZODone (DESYREL) 150 MG tablet      Diagnoses       Codes Comments    Autism spectrum disorder     ICD-10-CM: F84.0  ICD-9-CM: 299.00     Insomnia due to mental disorder     ICD-10-CM: F51.05  ICD-9-CM: 300.9, 327.02     Anxiety disorder, unspecified type     ICD-10-CM: F41.9  ICD-9-CM: 300.00     Unspecified mood (affective) disorder (CMS/HCC)     ICD-10-CM: F39  ICD-9-CM: 296.90           Visit Diagnoses:    ICD-10-CM ICD-9-CM   1. Autism spectrum disorder  F84.0 299.00   2. Insomnia due to mental disorder  F51.05 300.9     327.02   3. Anxiety disorder, unspecified type  F41.9 300.00   4. Unspecified mood (affective) disorder (CMS/HCC)  F39 296.90         GOALS:  Short Term Goals: Patient will be compliant with medication, and patient will have no significant medication related side effects.  Patient will be engaged in psychotherapy as indicated.  Patient will report subjective improvement of symptoms.  Long term goals: To stabilize mood and treat/improve subjective symptoms, the patient will stay out of the hospital, the patient will be at an optimal level of functioning, and the patient will take all medications as prescribed.  The patient's nurse and therapist/guardians verbalized understanding and agreement  with goals that were mutually set.      TREATMENT PLAN: Continue supportive psychotherapy efforts and medications as indicated.  Continue clonidine 0.1 mg by mouth twice daily for behaviors, take once in the morning and once at noon.  Continue clonidine 0.2 mg to take by mouth, for behaviors and sleep, once daily at bedtime.  Continue Seroquel 300 mg by mouth twice daily for mood, behaviors, and also sleep.  Continue hydroxyzine 100 mg by mouth once daily at bedtime for insomnia/sleep and anxiety.  Continue melatonin 5 mg by mouth once daily at bedtime for sleep.  Continue trazodone 150 mg by mouth once daily at bedtime for sleep.  Continue Depakote 250 mg by mouth once daily in the morning and 500 mg once daily at bedtime for mood and behaviors.  Pharmacological and Non-Pharmacological treatment options discussed during today's visit, including off label use of medications. Patient's caretakers/guardians acknowledged and verbally consented with current treatment plan and was educated on the importance of compliance with treatment and follow-up appointments.          MEDICATION ISSUES:  Discussed medication options and treatment plan of prescribed medication, any off label use of medication, as well as the risks, benefits, any black box warnings including increased suicidality, and side effects including but not limited to potential falls, dizziness, possible impaired driving, GI side effects (change in appetite, abdominal discomfort, nausea, vomiting, diarrhea, and/or constipation), dry mouth, somnolence, sedation, insomnia, activation, agitation, irritation, tremors, abnormal muscle movements or disorders, tardive dyskinesia, akathisia, asthenia, headache, sweating, possible bruising or rare bleeding, electrolyte and/or fluid abnormalities, change in blood pressure/heart rate/and or heart rhythm, hypotension, sexual dysfunction, rare impulse control problems, rare seizures, rare neuroleptic malignant syndrome,  increased risk of death and cerebrovascular events, change in blood glucose and increased risk for diabetes, change in triglycerides and cholesterol and increased risk for dyslipidemia,  weight gain, weight gain that can become problematic to health, skin conditions and reactions, and metabolic adversities among others. Patient's caretakers/guardians are agreeable to call the office with any worsening of symptoms or onset of side effects, or if any concerns or questions arise.  The contact information for the office is made available to the patient's guardians/caretakers. Patient's caretakers/guardians are agreeable to call 911 or go to the nearest ER should they begin having any SI/HI, or if any urgent concerns arise. No medication side effects or related complaints today.         SAFETY PLAN:  Patient/guardian was encouraged to call the clinic with any questions or concerns.  Patient/guardian was informed of access to emergency care. If patient were to develop any significant symptomatology, suicidal ideation, homicidal ideation, any concerns, or feel unsafe at any time they are to call the clinic and if unable to get immediate assistance should immediately call 911 or go to the nearest emergency room.  The Guardian is advised to remove or secure (lock away) all lethal weapons (including firearms/guns) and sharps (including razors, scissors, knives, sharps, objects to start fires, etc.).  All medications (including any prescribed and any over the counter medications) should be stored in a safe and secured/locked location that is not obtainable by children/adolescents, or the patient.  The guardian should administer all medications as indicated, prescribed and OTC, to the patient for safety and compliance.  The guardian/caretakers verbalized understanding and agreed to comply with the safety plan discussed.   Patient/guardian was given an opportunity and encouraged to ask questions about the medication, illness,  and treatment. Patient/guardian contracted verbally for the following: If you are experiencing an emotional crisis or have thoughts of harming yourself or others, please go to your nearest local emergency room or call 911. Will continue to re-assess medication response and side effects frequently to establish efficacy and ensure safety. Treatment risks, adverse effects, any black box warnings, side effects, off label usage, alternative treatment options, and benefits of medication and treatment discussed with patient and guardian.  Patient/guardian verbalized understanding in their own words. The patient/guardian verbalized understanding and agreed to comply with the safety plan discussed in their own words.  Patient/guardian given the number to the office. Number also available to the 24- hour suicide hotline.          MEDS ORDERED DURING VISIT:  New Medications Ordered This Visit   Medications   • cloNIDine (CATAPRES) 0.2 MG tablet     Sig: Take 0.5 tablets by mouth Daily With Breakfast & Lunch AND 1 tablet Every Night.     Dispense:  60 tablet     Refill:  0   • divalproex (Depakote) 250 MG DR tablet     Sig: Take 1 tablet by mouth Every Morning.     Dispense:  30 tablet     Refill:  0   • divalproex (DEPAKOTE) 500 MG DR tablet     Sig: Take 1 tablet by mouth Every Night.     Dispense:  30 tablet     Refill:  0   • hydrOXYzine (ATARAX) 50 MG tablet     Sig: Take 2 tablets by mouth every night at bedtime.     Dispense:  60 tablet     Refill:  0   • melatonin 5 MG tablet tablet     Sig: Take 1 tablet by mouth Every Night.     Dispense:  30 tablet     Refill:  0   • QUEtiapine (SEROquel) 300 MG tablet     Sig: Take 1 tablet by mouth 2 (Two) Times a Day.     Dispense:  60 tablet     Refill:  0   • traZODone (DESYREL) 150 MG tablet     Sig: Take 1 tablet by mouth Every Night.     Dispense:  30 tablet     Refill:  0       Return in about 4 weeks (around 3/23/2021), or if symptoms worsen or fail to improve, for  Recheck.       Progress toward goal: Not at goal    Functional Status: Severe impairment    Prognosis: Guarded with Ongoing Treatment     Treatment plan completed: 4/28/20.            This document has been electronically signed by JOSUE Antoine  February 23, 2021 13:23 EST    Please note that portions of this note were completed with a voice recognition program. Efforts were made to edit dictation, but occasionally words are mistranscribed.

## 2021-03-23 ENCOUNTER — TELEMEDICINE (OUTPATIENT)
Dept: PSYCHIATRY | Facility: CLINIC | Age: 21
End: 2021-03-23

## 2021-03-23 VITALS
TEMPERATURE: 98.1 F | SYSTOLIC BLOOD PRESSURE: 131 MMHG | HEART RATE: 105 BPM | WEIGHT: 155 LBS | DIASTOLIC BLOOD PRESSURE: 106 MMHG | HEIGHT: 65 IN | BODY MASS INDEX: 25.83 KG/M2

## 2021-03-23 DIAGNOSIS — F84.0 AUTISM SPECTRUM DISORDER: Primary | Chronic | ICD-10-CM

## 2021-03-23 DIAGNOSIS — F39 UNSPECIFIED MOOD (AFFECTIVE) DISORDER (HCC): ICD-10-CM

## 2021-03-23 DIAGNOSIS — F51.05 INSOMNIA DUE TO MENTAL DISORDER: ICD-10-CM

## 2021-03-23 DIAGNOSIS — F41.9 ANXIETY DISORDER, UNSPECIFIED TYPE: Chronic | ICD-10-CM

## 2021-03-23 DIAGNOSIS — F80.2 RECEPTIVE EXPRESSIVE LANGUAGE DISORDER: ICD-10-CM

## 2021-03-23 PROCEDURE — 99214 OFFICE O/P EST MOD 30 MIN: CPT | Performed by: NURSE PRACTITIONER

## 2021-03-23 RX ORDER — CLONIDINE HYDROCHLORIDE 0.2 MG/1
TABLET ORAL
Qty: 60 TABLET | Refills: 0 | Status: SHIPPED | OUTPATIENT
Start: 2021-03-23 | End: 2021-04-19 | Stop reason: SDUPTHER

## 2021-03-23 RX ORDER — QUETIAPINE FUMARATE 300 MG/1
300 TABLET, FILM COATED ORAL 2 TIMES DAILY
Qty: 60 TABLET | Refills: 0 | Status: SHIPPED | OUTPATIENT
Start: 2021-03-23 | End: 2021-04-19 | Stop reason: SDUPTHER

## 2021-03-23 RX ORDER — DIVALPROEX SODIUM 500 MG/1
500 TABLET, DELAYED RELEASE ORAL NIGHTLY
Qty: 30 TABLET | Refills: 0 | Status: SHIPPED | OUTPATIENT
Start: 2021-03-23 | End: 2021-04-19 | Stop reason: SDUPTHER

## 2021-03-23 RX ORDER — TRAZODONE HYDROCHLORIDE 150 MG/1
150 TABLET ORAL NIGHTLY
Qty: 30 TABLET | Refills: 0 | Status: SHIPPED | OUTPATIENT
Start: 2021-03-23 | End: 2021-04-19 | Stop reason: SDUPTHER

## 2021-03-23 RX ORDER — DIVALPROEX SODIUM 250 MG/1
250 TABLET, DELAYED RELEASE ORAL EVERY MORNING
Qty: 30 TABLET | Refills: 0 | Status: SHIPPED | OUTPATIENT
Start: 2021-03-23 | End: 2021-04-19 | Stop reason: SDUPTHER

## 2021-03-23 RX ORDER — HYDROXYZINE 50 MG/1
100 TABLET, FILM COATED ORAL
Qty: 60 TABLET | Refills: 0 | Status: SHIPPED | OUTPATIENT
Start: 2021-03-23 | End: 2021-04-19 | Stop reason: SDUPTHER

## 2021-03-23 RX ORDER — CHOLECALCIFEROL (VITAMIN D3) 125 MCG
5 CAPSULE ORAL NIGHTLY
Qty: 30 TABLET | Refills: 0 | Status: SHIPPED | OUTPATIENT
Start: 2021-03-23 | End: 2021-04-19 | Stop reason: SDUPTHER

## 2021-03-23 NOTE — PROGRESS NOTES
This provider is located at the [facility] (through [facility]), [address], [place], [address] using a secure Zoom platform.  The patient is seen remotely at [facility] on the [facility], [address], [place], [place], [address] via a secure Zoom platform provided through [facility] with staff present.  The patient's condition being diagnosed/treated is appropriate for telemedicine. The provider identified herself as well as her credentials.  The patient and/or patient's guardian consent to be seen remotely, and when consent is given they understand that the consent allows for patient identifiable information to be sent to a third party as needed.   They may refuse to be seen remotely at any time. The electronic data is encrypted and password protected, and the patient has been advised of the potential risks to privacy notwithstanding such measures.    I did not complete this video visit with the patient using [employer].  You have chosen to receive care through a telehealth visit.  Do you consent to use a video/audio connection for your medical care today? Yes        Subjective   Shaun Butler is a 20 y.o. male who presents today for follow up    Chief Complaint:  Bipolar disorder, autism, impulse-control disorder, disruptive behavior, mixed receptive expressive language disorder    Accompanied by: The patient was being seen with staff, a therapist, and a nurse (Cindy) due to the patient's condition, and to assist with sharing of information and facilitation of visit.    History of Present Illness:   The staff at the [facility] describe the patient's moods and behaviors as his typical baseline over the last few weeks.  The staff report the patient is doing well overall for him, except that he is just hyperactive (which is baseline for him).  The staff report if things around him are chaotic he can get  upset, but typically is easy going.  The staff report the patient's appetite as good.  The staff report the patient's sleep as fair, or at the patient's typical baseline.  They report he will sit up some nights (all night and not sleep), but he also naps during the day sometimes.  The staff deny any new medical problems or changes in medications since last appointment with this facility.  The staff report compliance with the patient's current medication regimen.  The staff deny any current side effects from the patient's current medication regimen.  The staff deny any noticeable abnormal muscle movements or tics, or changes from the patient's baseline.  The staff would like to not adjust or change the patient's medications at this visit as he seems to be doing well and at his typical baseline with is current medication regimen.  The staff deny any expressions of auditory hallucinations or visual hallucinations, and the staff deny any expressions of suicidal ideations or homicidal ideations, but it is hard to determine due to the nonverbal status of the patient.        The following portions of the patient's history were reviewed and updated as appropriate: allergies, current medications, past family history, past medical history, past social history, past surgical history and problem list.          Past Medical History:  Past Medical History:   Diagnosis Date   • Autism spectrum disorder    • Bipolar disorder (CMS/HCC)    • Disruptive behavior disorder    • Impulse control disorder    • Mixed receptive-expressive language disorder    • Psychosis (CMS/HCC)        Social History:  Social History     Socioeconomic History   • Marital status: Single     Spouse name: Not on file   • Number of children: Not on file   • Years of education: Not on file   • Highest education level: Not on file   Tobacco Use   • Smoking status: Never Smoker   • Smokeless tobacco: Never Used   Substance and Sexual Activity   • Alcohol use:  Never   • Drug use: Never   • Sexual activity: Defer       Family History:  Family History   Family history unknown: Yes       Past Surgical History:  History reviewed. No pertinent surgical history.    Problem List:  There is no problem list on file for this patient.      Allergy:   Allergies   Allergen Reactions   • Cinnamon Unknown (See Comments)     Taken from referral   • Fish-Derived Products Unknown (See Comments)     Taken from referral   • Nutmeg Oil (Myristica Oil) Unknown (See Comments)     Taken from referral   • Tomato Unknown - High Severity     Raw tomatoes        Current Medications:   Current Outpatient Medications   Medication Sig Dispense Refill   • cloNIDine (CATAPRES) 0.2 MG tablet Take 0.5 tablets by mouth Daily With Breakfast & Lunch AND 1 tablet Every Night. 60 tablet 0   • divalproex (Depakote) 250 MG DR tablet Take 1 tablet by mouth Every Morning. 30 tablet 0   • divalproex (DEPAKOTE) 500 MG DR tablet Take 1 tablet by mouth Every Night. 30 tablet 0   • hydrOXYzine (ATARAX) 50 MG tablet Take 2 tablets by mouth every night at bedtime. 60 tablet 0   • melatonin 5 MG tablet tablet Take 1 tablet by mouth Every Night. 30 tablet 0   • QUEtiapine (SEROquel) 300 MG tablet Take 1 tablet by mouth 2 (Two) Times a Day. 60 tablet 0   • traZODone (DESYREL) 150 MG tablet Take 1 tablet by mouth Every Night. 30 tablet 0     No current facility-administered medications for this visit.       Review of Symptoms:    Review of Systems   Constitutional: Negative.    HENT: Negative.    Eyes: Negative.    Respiratory: Negative.    Cardiovascular: Negative.    Gastrointestinal: Negative.    Endocrine: Negative.    Genitourinary: Negative.    Musculoskeletal: Negative.    Skin: Negative.    Neurological: Negative.    Psychiatric/Behavioral: Positive for agitation, behavioral problems, decreased concentration, sleep disturbance, positive for hyperactivity and stress. The patient is nervous/anxious.          Physical  "Exam:   Physical Exam   Neurological: He is alert.   Psychiatric: He is hyperactive. Cognition and memory are impaired. He expresses no homicidal and no suicidal ideation. He is noncommunicative.   Vitals reviewed.        Blood pressure (!) 131/106, pulse 105, temperature 98.1 °F (36.7 °C), height 165.1 cm (65\"), weight 70.3 kg (155 lb). Body mass index is 25.79 kg/m².        Mental Status Exam:   Hygiene:   good  Cooperation:  Guarded  Eye Contact:  Poor  Psychomotor Behavior:  Hyperactive  Affect:  Restricted  Mood: anxious  Speech:  minimal, he can repeat sounds and some words, no reported change from patient's baseline  Thought Process:  Unable to demonstrate  Thought Content:  Unable to demonstrate  Suicidal:  None demonstrated  Homicidal:  None demonstrated  Hallucinations:  Not demonstrated today   Delusion:  Unable to demonstrate   Orientation:  Unable to evaluate and he will look at you and respond with sounds when you say his name  Reliability:  unable to demonstrate  Insight:  None  Judgement:  Impaired  Impulse Control:  Impaired  Physical/Medical Issues:  Chronic health issues, no acute physical or medical issues today       Lab Results:   No visits with results within 1 Month(s) from this visit.   Latest known visit with results is:   No results found for any previous visit.           Assessment/Plan   Problems Addressed this Visit     None      Visit Diagnoses     Autism spectrum disorder  (Chronic)   -  Primary    Relevant Medications    cloNIDine (CATAPRES) 0.2 MG tablet    divalproex (Depakote) 250 MG DR tablet    divalproex (DEPAKOTE) 500 MG DR tablet    hydrOXYzine (ATARAX) 50 MG tablet    QUEtiapine (SEROquel) 300 MG tablet    traZODone (DESYREL) 150 MG tablet    Unspecified mood (affective) disorder (CMS/HCC)        Relevant Medications    divalproex (Depakote) 250 MG DR tablet    divalproex (DEPAKOTE) 500 MG DR tablet    hydrOXYzine (ATARAX) 50 MG tablet    QUEtiapine (SEROquel) 300 MG tablet  "    traZODone (DESYREL) 150 MG tablet    Anxiety disorder, unspecified type  (Chronic)       Relevant Medications    cloNIDine (CATAPRES) 0.2 MG tablet    hydrOXYzine (ATARAX) 50 MG tablet    QUEtiapine (SEROquel) 300 MG tablet    traZODone (DESYREL) 150 MG tablet    Insomnia due to mental disorder        Relevant Medications    hydrOXYzine (ATARAX) 50 MG tablet    melatonin 5 MG tablet tablet    QUEtiapine (SEROquel) 300 MG tablet    traZODone (DESYREL) 150 MG tablet    Receptive expressive language disorder          Diagnoses       Codes Comments    Autism spectrum disorder    -  Primary ICD-10-CM: F84.0  ICD-9-CM: 299.00     Unspecified mood (affective) disorder (CMS/HCC)     ICD-10-CM: F39  ICD-9-CM: 296.90     Anxiety disorder, unspecified type     ICD-10-CM: F41.9  ICD-9-CM: 300.00     Insomnia due to mental disorder     ICD-10-CM: F51.05  ICD-9-CM: 300.9, 327.02     Receptive expressive language disorder     ICD-10-CM: F80.2  ICD-9-CM: 315.32           Visit Diagnoses:    ICD-10-CM ICD-9-CM   1. Autism spectrum disorder  F84.0 299.00   2. Unspecified mood (affective) disorder (CMS/HCC)  F39 296.90   3. Anxiety disorder, unspecified type  F41.9 300.00   4. Insomnia due to mental disorder  F51.05 300.9     327.02   5. Receptive expressive language disorder  F80.2 315.32         GOALS:  Short Term Goals: Patient will be compliant with medication, and patient will have no significant medication related side effects.  Patient will be engaged in psychotherapy as indicated.  Patient will report subjective improvement of symptoms.  Long term goals: To stabilize mood and treat/improve subjective symptoms, the patient will stay out of the hospital, the patient will be at an optimal level of functioning, and the patient will take all medications as prescribed.  The patient's nurse and therapist/guardians verbalized understanding and agreement with goals that were mutually set.      TREATMENT PLAN: Continue supportive  psychotherapy efforts and medications as indicated.  Continue clonidine 0.1 mg by mouth twice daily for behaviors, take once in the morning and once at noon.  Continue clonidine 0.2 mg to take by mouth, for behaviors and sleep, once daily at bedtime.  Continue Seroquel 300 mg by mouth twice daily for mood, behaviors, and also sleep.  Continue hydroxyzine 100 mg by mouth once daily at bedtime for insomnia/sleep and anxiety.  Continue melatonin 5 mg by mouth once daily at bedtime for sleep.  Continue trazodone 150 mg by mouth once daily at bedtime for sleep.  Continue Depakote 250 mg by mouth once daily in the morning and 500 mg once daily at bedtime for mood and behaviors.  Pharmacological and Non-Pharmacological treatment options discussed during today's visit, including off label use of medications. Patient's caretakers/guardians acknowledged and verbally consented with current treatment plan and was educated on the importance of compliance with treatment and follow-up appointments.          MEDICATION ISSUES:  Discussed medication options and treatment plan of prescribed medication, any off label use of medication, as well as the risks, benefits, any black box warnings including increased suicidality, and side effects including but not limited to potential falls, dizziness, possible impaired driving, GI side effects (change in appetite, abdominal discomfort, nausea, vomiting, diarrhea, and/or constipation), dry mouth, somnolence, sedation, insomnia, activation, agitation, irritation, tremors, abnormal muscle movements or disorders, tardive dyskinesia, akathisia, asthenia, headache, sweating, possible bruising or rare bleeding, electrolyte and/or fluid abnormalities, change in blood pressure/heart rate/and or heart rhythm, hypotension, sexual dysfunction, rare impulse control problems, rare seizures, rare neuroleptic malignant syndrome, increased risk of death and cerebrovascular events, change in blood glucose and  increased risk for diabetes, change in triglycerides and cholesterol and increased risk for dyslipidemia,  weight gain, weight gain that can become problematic to health, skin conditions and reactions, and metabolic adversities among others. Patient's caretakers/guardians are agreeable to call the office with any worsening of symptoms or onset of side effects, or if any concerns or questions arise.  The contact information for the office is made available to the patient's guardians/caretakers. Patient's caretakers/guardians are agreeable to call 911 or go to the nearest ER should they begin having any SI/HI, or if any urgent concerns arise. No medication side effects or related complaints today.         SAFETY PLAN:  Patient/guardian was encouraged to call the clinic with any questions or concerns.  Patient/guardian was informed of access to emergency care. If patient were to develop any significant symptomatology, suicidal ideation, homicidal ideation, any concerns, or feel unsafe at any time they are to call the clinic and if unable to get immediate assistance should immediately call 911 or go to the nearest emergency room.  The Guardian is advised to remove or secure (lock away) all lethal weapons (including firearms/guns) and sharps (including razors, scissors, knives, sharps, objects to start fires, etc.).  All medications (including any prescribed and any over the counter medications) should be stored in a safe and secured/locked location that is not obtainable by children/adolescents, or the patient.  The guardian should administer all medications as indicated, prescribed and OTC, to the patient for safety and compliance.  The guardian/caretakers verbalized understanding and agreed to comply with the safety plan discussed.   Patient/guardian was given an opportunity and encouraged to ask questions about the medication, illness, and treatment. Patient/guardian contracted verbally for the following: If you are  experiencing an emotional crisis or have thoughts of harming yourself or others, please go to your nearest local emergency room or call 911. Will continue to re-assess medication response and side effects frequently to establish efficacy and ensure safety. Treatment risks, adverse effects, any black box warnings, side effects, off label usage, alternative treatment options, and benefits of medication and treatment discussed with patient and guardian.  Patient/guardian verbalized understanding in their own words. The patient/guardian verbalized understanding and agreed to comply with the safety plan discussed in their own words.  Patient/guardian given the number to the office. Number also available to the 24- hour suicide hotline.          MEDS ORDERED DURING VISIT:  New Medications Ordered This Visit   Medications   • cloNIDine (CATAPRES) 0.2 MG tablet     Sig: Take 0.5 tablets by mouth Daily With Breakfast & Lunch AND 1 tablet Every Night.     Dispense:  60 tablet     Refill:  0   • divalproex (Depakote) 250 MG DR tablet     Sig: Take 1 tablet by mouth Every Morning.     Dispense:  30 tablet     Refill:  0   • divalproex (DEPAKOTE) 500 MG DR tablet     Sig: Take 1 tablet by mouth Every Night.     Dispense:  30 tablet     Refill:  0   • hydrOXYzine (ATARAX) 50 MG tablet     Sig: Take 2 tablets by mouth every night at bedtime.     Dispense:  60 tablet     Refill:  0   • melatonin 5 MG tablet tablet     Sig: Take 1 tablet by mouth Every Night.     Dispense:  30 tablet     Refill:  0   • QUEtiapine (SEROquel) 300 MG tablet     Sig: Take 1 tablet by mouth 2 (Two) Times a Day.     Dispense:  60 tablet     Refill:  0   • traZODone (DESYREL) 150 MG tablet     Sig: Take 1 tablet by mouth Every Night.     Dispense:  30 tablet     Refill:  0       Return in about 4 weeks (around 4/20/2021), or if symptoms worsen or fail to improve, for Recheck.       Progress toward goal: Not at goal    Functional Status: Severe  impairment    Prognosis: Guarded with Ongoing Treatment     Treatment plan completed: 4/28/20.            This document has been electronically signed by JOSUE Antoine  March 23, 2021 17:42 EDT    Please note that portions of this note were completed with a voice recognition program. Efforts were made to edit dictation, but occasionally words are mistranscribed.

## 2021-04-19 DIAGNOSIS — F84.0 AUTISM SPECTRUM DISORDER: Chronic | ICD-10-CM

## 2021-04-19 DIAGNOSIS — F39 UNSPECIFIED MOOD (AFFECTIVE) DISORDER (HCC): ICD-10-CM

## 2021-04-19 DIAGNOSIS — F51.05 INSOMNIA DUE TO MENTAL DISORDER: ICD-10-CM

## 2021-04-19 DIAGNOSIS — F41.9 ANXIETY DISORDER, UNSPECIFIED TYPE: Chronic | ICD-10-CM

## 2021-04-19 RX ORDER — DIVALPROEX SODIUM 500 MG/1
500 TABLET, DELAYED RELEASE ORAL NIGHTLY
Qty: 30 TABLET | Refills: 0 | Status: SHIPPED | OUTPATIENT
Start: 2021-04-19 | End: 2021-04-27 | Stop reason: SDUPTHER

## 2021-04-19 RX ORDER — QUETIAPINE FUMARATE 300 MG/1
300 TABLET, FILM COATED ORAL 2 TIMES DAILY
Qty: 60 TABLET | Refills: 0 | Status: SHIPPED | OUTPATIENT
Start: 2021-04-19 | End: 2021-04-27 | Stop reason: SDUPTHER

## 2021-04-19 RX ORDER — CHOLECALCIFEROL (VITAMIN D3) 125 MCG
5 CAPSULE ORAL NIGHTLY
Qty: 30 TABLET | Refills: 0 | Status: SHIPPED | OUTPATIENT
Start: 2021-04-19 | End: 2021-04-27 | Stop reason: SDUPTHER

## 2021-04-19 RX ORDER — DIVALPROEX SODIUM 250 MG/1
250 TABLET, DELAYED RELEASE ORAL EVERY MORNING
Qty: 30 TABLET | Refills: 0 | Status: SHIPPED | OUTPATIENT
Start: 2021-04-19 | End: 2021-04-27 | Stop reason: SDUPTHER

## 2021-04-19 RX ORDER — TRAZODONE HYDROCHLORIDE 150 MG/1
150 TABLET ORAL NIGHTLY
Qty: 30 TABLET | Refills: 0 | Status: SHIPPED | OUTPATIENT
Start: 2021-04-19 | End: 2021-04-27 | Stop reason: SDUPTHER

## 2021-04-19 RX ORDER — HYDROXYZINE 50 MG/1
100 TABLET, FILM COATED ORAL
Qty: 60 TABLET | Refills: 0 | Status: SHIPPED | OUTPATIENT
Start: 2021-04-19 | End: 2021-04-27 | Stop reason: SDUPTHER

## 2021-04-19 RX ORDER — CLONIDINE HYDROCHLORIDE 0.2 MG/1
TABLET ORAL
Qty: 60 TABLET | Refills: 0 | Status: SHIPPED | OUTPATIENT
Start: 2021-04-19 | End: 2021-04-27 | Stop reason: SDUPTHER

## 2021-04-27 ENCOUNTER — TELEMEDICINE (OUTPATIENT)
Dept: PSYCHIATRY | Facility: CLINIC | Age: 21
End: 2021-04-27

## 2021-04-27 VITALS
TEMPERATURE: 98.1 F | SYSTOLIC BLOOD PRESSURE: 125 MMHG | HEIGHT: 65 IN | HEART RATE: 120 BPM | DIASTOLIC BLOOD PRESSURE: 86 MMHG | BODY MASS INDEX: 25.66 KG/M2 | WEIGHT: 154 LBS

## 2021-04-27 DIAGNOSIS — F84.0 AUTISM SPECTRUM DISORDER: Primary | ICD-10-CM

## 2021-04-27 DIAGNOSIS — F41.9 ANXIETY DISORDER, UNSPECIFIED TYPE: Chronic | ICD-10-CM

## 2021-04-27 DIAGNOSIS — F80.2 RECEPTIVE EXPRESSIVE LANGUAGE DISORDER: ICD-10-CM

## 2021-04-27 DIAGNOSIS — F51.05 INSOMNIA DUE TO MENTAL DISORDER: ICD-10-CM

## 2021-04-27 DIAGNOSIS — F39 UNSPECIFIED MOOD (AFFECTIVE) DISORDER (HCC): ICD-10-CM

## 2021-04-27 DIAGNOSIS — F90.9 HYPERACTIVITY: ICD-10-CM

## 2021-04-27 PROCEDURE — 99214 OFFICE O/P EST MOD 30 MIN: CPT | Performed by: NURSE PRACTITIONER

## 2021-04-27 RX ORDER — DIVALPROEX SODIUM 500 MG/1
500 TABLET, DELAYED RELEASE ORAL NIGHTLY
Qty: 30 TABLET | Refills: 0 | Status: SHIPPED | OUTPATIENT
Start: 2021-04-27

## 2021-04-27 RX ORDER — CLONIDINE HYDROCHLORIDE 0.2 MG/1
TABLET ORAL
Qty: 60 TABLET | Refills: 0 | Status: SHIPPED | OUTPATIENT
Start: 2021-04-27

## 2021-04-27 RX ORDER — CHOLECALCIFEROL (VITAMIN D3) 125 MCG
5 CAPSULE ORAL NIGHTLY
Qty: 30 TABLET | Refills: 0 | Status: SHIPPED | OUTPATIENT
Start: 2021-04-27

## 2021-04-27 RX ORDER — DEXTROAMPHETAMINE SACCHARATE, AMPHETAMINE ASPARTATE, DEXTROAMPHETAMINE SULFATE AND AMPHETAMINE SULFATE 1.25; 1.25; 1.25; 1.25 MG/1; MG/1; MG/1; MG/1
5 TABLET ORAL EVERY MORNING
Qty: 30 TABLET | Refills: 0 | Status: SHIPPED | OUTPATIENT
Start: 2021-04-27 | End: 2021-05-05 | Stop reason: DRUGHIGH

## 2021-04-27 RX ORDER — HYDROXYZINE 50 MG/1
100 TABLET, FILM COATED ORAL
Qty: 60 TABLET | Refills: 0 | Status: SHIPPED | OUTPATIENT
Start: 2021-04-27

## 2021-04-27 RX ORDER — DIVALPROEX SODIUM 250 MG/1
250 TABLET, DELAYED RELEASE ORAL EVERY MORNING
Qty: 30 TABLET | Refills: 0 | Status: SHIPPED | OUTPATIENT
Start: 2021-04-27

## 2021-04-27 RX ORDER — TRAZODONE HYDROCHLORIDE 150 MG/1
150 TABLET ORAL NIGHTLY
Qty: 30 TABLET | Refills: 0 | Status: SHIPPED | OUTPATIENT
Start: 2021-04-27

## 2021-04-27 RX ORDER — QUETIAPINE FUMARATE 300 MG/1
300 TABLET, FILM COATED ORAL 2 TIMES DAILY
Qty: 60 TABLET | Refills: 0 | Status: SHIPPED | OUTPATIENT
Start: 2021-04-27

## 2021-04-27 NOTE — PROGRESS NOTES
This provider is located at the [facility] (through [facility]), [address], [place], [address] using a secure Zoom platform.  The patient is seen remotely at [facility] the [facility], [address], [place], [address] via a secure Zoom platform provided through [facility] with staff present.  The patient's condition being diagnosed/treated is appropriate for telemedicine. The provider identified herself as well as her credentials.  The patient and/or patient's guardian consent to be seen remotely, and when consent is given they understand that the consent allows for patient identifiable information to be sent to a third party as needed.   They may refuse to be seen remotely at any time. The electronic data is encrypted and password protected, and the patient has been advised of the potential risks to privacy notwithstanding such measures.    I did not complete this video visit with the patient using [employer].  You have chosen to receive care through a telehealth visit.  Do you consent to use a video/audio connection for your medical care today? Yes        Subjective   Shaun Butler is a 20 y.o. male who presents today for follow up    Chief Complaint:  Bipolar disorder, autism, impulse-control disorder, disruptive behavior, mixed receptive expressive language disorder    Accompanied by: The patient was being seen with staff, a therapist, and a nurse (Cindy) due to the patient's condition, and to assist with sharing of information and facilitation of visit.    History of Present Illness:   The staff at the [facility] describe the patient's moods and behaviors as hyperactive over the last few weeks.  The staff report they aren't sure if it is a season change, but he has been more hyperactive than usual.  The staff report the have no record of the patient having ever tried a stimulant for his  hyperactivity in the past.  They report he has not had to be in any holds.  They report he has had aggression, but his aggression is trying to hug tightly, or putting his hands on your shoulders and trying to pull/push you down to the ground.  The staff report the patient's appetite as good.  The staff report the patient's sleep as poor.  The staff report he typically only gets one good night of sleep a week it seems, which is his baseline.  The staff report when the patient is not sleeping he is always hyperactive, making sounds, and moving around.  The staff deny any new medical problems or changes in medications since last appointment with this facility.  The staff report compliance with the patient's current medication regimen.  The staff deny any current side effects from the patient's current medication regimen.  The staff deny any noticeable abnormal muscle movements or tics, or changes from the patient's baseline.  The staff would like to adjust the patient's medications at this visit to help with his hyperactivity, and are in verbal agreement with a trial of a stimulant for a.m. dosing to see how it affects his hyperactivity.  The staff deny any expressions of auditory hallucinations or visual hallucinations, and the staff deny any expressions of suicidal ideations or homicidal ideations, but it is hard to determine due to the nonverbal status of the patient.        The following portions of the patient's history were reviewed and updated as appropriate: allergies, current medications, past family history, past medical history, past social history, past surgical history and problem list.          Past Medical History:  Past Medical History:   Diagnosis Date   • Autism spectrum disorder    • Bipolar disorder (CMS/HCC)    • Disruptive behavior disorder    • Impulse control disorder    • Mixed receptive-expressive language disorder    • Psychosis (CMS/HCC)        Social History:  Social History          Socioeconomic History   • Marital status: Single     Spouse name: Not on file   • Number of children: Not on file   • Years of education: Not on file   • Highest education level: Not on file   Tobacco Use   • Smoking status: Never Smoker   • Smokeless tobacco: Never Used   Substance and Sexual Activity   • Alcohol use: Never   • Drug use: Never   • Sexual activity: Defer       Family History:  Family History   Family history unknown: Yes       Past Surgical History:  History reviewed. No pertinent surgical history.    Problem List:  There is no problem list on file for this patient.      Allergy:   Allergies   Allergen Reactions   • Cinnamon Unknown (See Comments)     Taken from referral   • Fish-Derived Products Unknown (See Comments)     Taken from referral   • Nutmeg Oil (Myristica Oil) Unknown (See Comments)     Taken from referral   • Tomato Unknown - High Severity     Raw tomatoes        Current Medications:   Current Outpatient Medications   Medication Sig Dispense Refill   • amphetamine-dextroamphetamine (Adderall) 5 MG tablet Take 1 tablet by mouth Every Morning. 30 tablet 0   • cloNIDine (CATAPRES) 0.2 MG tablet Take 0.5 tablets by mouth Daily With Breakfast & Lunch AND 1 tablet Every Night. 60 tablet 0   • divalproex (Depakote) 250 MG DR tablet Take 1 tablet by mouth Every Morning. 30 tablet 0   • divalproex (DEPAKOTE) 500 MG DR tablet Take 1 tablet by mouth Every Night. 30 tablet 0   • hydrOXYzine (ATARAX) 50 MG tablet Take 2 tablets by mouth every night at bedtime. 60 tablet 0   • melatonin 5 MG tablet tablet Take 1 tablet by mouth Every Night. 30 tablet 0   • QUEtiapine (SEROquel) 300 MG tablet Take 1 tablet by mouth 2 (Two) Times a Day. 60 tablet 0   • traZODone (DESYREL) 150 MG tablet Take 1 tablet by mouth Every Night. 30 tablet 0     No current facility-administered medications for this visit.       Review of Symptoms:    Review of Systems   Constitutional: Negative.    HENT: Negative.    Eyes:  "Negative.    Respiratory: Negative.    Cardiovascular: Negative.    Gastrointestinal: Negative.    Endocrine: Negative.    Genitourinary: Negative.    Musculoskeletal: Negative.    Skin: Negative.    Neurological: Negative.    Psychiatric/Behavioral: Positive for agitation, behavioral problems, decreased concentration, sleep disturbance, positive for hyperactivity and stress. The patient is nervous/anxious.          Physical Exam:   Physical Exam   Neurological: He is alert.   Psychiatric: He is hyperactive. Cognition and memory are impaired. He expresses no homicidal and no suicidal ideation. He is noncommunicative.   Vitals reviewed.        Blood pressure 125/86, pulse 120, temperature 98.1 °F (36.7 °C), height 165.1 cm (65\"), weight 69.9 kg (154 lb). Body mass index is 25.63 kg/m².        Mental Status Exam:   Hygiene:   good  Cooperation:  Guarded  Eye Contact:  Poor  Psychomotor Behavior:  Hyperactive  Affect:  Restricted  Mood: anxious  Speech:  minimal, he can repeat sounds and some words, no reported change from patient's baseline  Thought Process:  Unable to demonstrate  Thought Content:  Unable to demonstrate  Suicidal:  None demonstrated  Homicidal:  None demonstrated  Hallucinations:  Not demonstrated today   Delusion:  Unable to demonstrate   Orientation:  Unable to evaluate and he will look at you and respond with sounds when you say his name  Reliability:  unable to demonstrate  Insight:  None  Judgement:  Impaired  Impulse Control:  Impaired  Physical/Medical Issues:  Chronic health issues, no acute physical or medical issues today       Lab Results:   No visits with results within 1 Month(s) from this visit.   Latest known visit with results is:   No results found for any previous visit.           Assessment/Plan   Problems Addressed this Visit     None      Visit Diagnoses     Autism spectrum disorder    -  Primary    Relevant Medications    cloNIDine (CATAPRES) 0.2 MG tablet    divalproex " (Depakote) 250 MG DR tablet    divalproex (DEPAKOTE) 500 MG DR tablet    hydrOXYzine (ATARAX) 50 MG tablet    QUEtiapine (SEROquel) 300 MG tablet    traZODone (DESYREL) 150 MG tablet    amphetamine-dextroamphetamine (Adderall) 5 MG tablet    Unspecified mood (affective) disorder (CMS/[facility])        Relevant Medications    divalproex (Depakote) 250 MG DR tablet    divalproex (DEPAKOTE) 500 MG DR tablet    hydrOXYzine (ATARAX) 50 MG tablet    QUEtiapine (SEROquel) 300 MG tablet    traZODone (DESYREL) 150 MG tablet    amphetamine-dextroamphetamine (Adderall) 5 MG tablet    Anxiety disorder, unspecified type  (Chronic)       Relevant Medications    cloNIDine (CATAPRES) 0.2 MG tablet    hydrOXYzine (ATARAX) 50 MG tablet    QUEtiapine (SEROquel) 300 MG tablet    traZODone (DESYREL) 150 MG tablet    amphetamine-dextroamphetamine (Adderall) 5 MG tablet    Receptive expressive language disorder        Insomnia due to mental disorder        Relevant Medications    hydrOXYzine (ATARAX) 50 MG tablet    melatonin 5 MG tablet tablet    QUEtiapine (SEROquel) 300 MG tablet    traZODone (DESYREL) 150 MG tablet    amphetamine-dextroamphetamine (Adderall) 5 MG tablet    Hyperactivity        Relevant Medications    amphetamine-dextroamphetamine (Adderall) 5 MG tablet      Diagnoses       Codes Comments    Autism spectrum disorder    -  Primary ICD-10-CM: F84.0  ICD-9-CM: 299.00     Unspecified mood (affective) disorder (CMS/[facility])     ICD-10-CM: F39  ICD-9-CM: 296.90     Anxiety disorder, unspecified type     ICD-10-CM: F41.9  ICD-9-CM: 300.00     Receptive expressive language disorder     ICD-10-CM: F80.2  ICD-9-CM: 315.32     Insomnia due to mental disorder     ICD-10-CM: F51.05  ICD-9-CM: 300.9, 327.02     Hyperactivity     ICD-10-CM: F90.9  ICD-9-CM: 314.9           Visit Diagnoses:    ICD-10-CM ICD-9-CM   1. Autism spectrum disorder  F84.0 299.00   2. Unspecified mood (affective) disorder (CMS/HCC)  F39 296.90   3. Anxiety disorder,  unspecified type  F41.9 300.00   4. Receptive expressive language disorder  F80.2 315.32   5. Insomnia due to mental disorder  F51.05 300.9     327.02   6. Hyperactivity  F90.9 314.9         GOALS:  Short Term Goals: Patient will be compliant with medication, and patient will have no significant medication related side effects.  Patient will be engaged in psychotherapy as indicated.  Patient will report subjective improvement of symptoms.  Long term goals: To stabilize mood and treat/improve subjective symptoms, the patient will stay out of the hospital, the patient will be at an optimal level of functioning, and the patient will take all medications as prescribed.  The patient's nurse and therapist/guardians verbalized understanding and agreement with goals that were mutually set.      TREATMENT PLAN: Continue supportive psychotherapy efforts and medications as indicated.  Pharmacological and Non-Pharmacological treatment options discussed during today's visit, including off label use of medications. Patient's caretakers/guardians acknowledged and verbally consented with current treatment plan and was educated on the importance of compliance with treatment and follow-up appointments.    -Continue clonidine 0.1 mg by mouth twice daily for behaviors, take once in the morning and once at noon.  -Continue clonidine 0.2 mg to take by mouth, for behaviors and sleep, once daily at bedtime.  -Continue Seroquel 300 mg by mouth twice daily for mood, behaviors, and also sleep.  -Continue hydroxyzine 100 mg by mouth once daily at bedtime for insomnia/sleep and anxiety.  -Continue melatonin 5 mg by mouth once daily at bedtime for sleep.  -Continue trazodone 150 mg by mouth once daily at bedtime for sleep.  -Continue Depakote 250 mg by mouth once daily in the morning and 500 mg once daily at bedtime for mood and behaviors.   -Start trial of Adderall 5 mg by mouth once daily in the morning for symptoms of  hyperactivity.        MEDICATION ISSUES:  Discussed medication options and treatment plan of prescribed medication, any off label use of medication, as well as the risks, benefits, any black box warnings including increased suicidality, and side effects including but not limited to potential falls, dizziness, possible impaired driving, GI side effects (change in appetite, abdominal discomfort, nausea, vomiting, diarrhea, and/or constipation), dry mouth, somnolence, sedation, insomnia, activation, agitation, irritation, tremors, abnormal muscle movements or disorders, tardive dyskinesia, akathisia, asthenia, headache, sweating, possible bruising or rare bleeding, electrolyte and/or fluid abnormalities, change in blood pressure/heart rate/and or heart rhythm, hypotension, sexual dysfunction, rare impulse control problems, rare seizures, rare neuroleptic malignant syndrome, increased risk of death and cerebrovascular events, change in blood glucose and increased risk for diabetes, change in triglycerides and cholesterol and increased risk for dyslipidemia,  weight gain, weight gain that can become problematic to health, skin conditions and reactions, and metabolic adversities among others. Patient's caretakers/guardians are agreeable to call the office with any worsening of symptoms or onset of side effects, or if any concerns or questions arise.  The contact information for the office is made available to the patient's guardians/caretakers. Patient's caretakers/guardians are agreeable to call 911 or go to the nearest ER should they begin having any SI/HI, or if any urgent concerns arise. No medication side effects or related complaints today.         SAFETY PLAN:  Patient/guardian was encouraged to call the clinic with any questions or concerns.  Patient/guardian was informed of access to emergency care. If patient were to develop any significant symptomatology, suicidal ideation, homicidal ideation, any concerns, or  feel unsafe at any time they are to call the clinic and if unable to get immediate assistance should immediately call 911 or go to the nearest emergency room.  The Guardian is advised to remove or secure (lock away) all lethal weapons (including firearms/guns) and sharps (including razors, scissors, knives, sharps, objects to start fires, etc.).  All medications (including any prescribed and any over the counter medications) should be stored in a safe and secured/locked location that is not obtainable by children/adolescents, or the patient.  The guardian should administer all medications as indicated, prescribed and OTC, to the patient for safety and compliance.  The guardian/caretakers verbalized understanding and agreed to comply with the safety plan discussed.   Patient/guardian was given an opportunity and encouraged to ask questions about the medication, illness, and treatment. Patient/guardian contracted verbally for the following: If you are experiencing an emotional crisis or have thoughts of harming yourself or others, please go to your nearest local emergency room or call 911. Will continue to re-assess medication response and side effects frequently to establish efficacy and ensure safety. Treatment risks, adverse effects, any black box warnings, side effects, off label usage, alternative treatment options, and benefits of medication and treatment discussed with patient and guardian.  Patient/guardian verbalized understanding in their own words. The patient/guardian verbalized understanding and agreed to comply with the safety plan discussed in their own words.  Patient/guardian given the number to the office. Number also available to the 24- hour suicide hotline.          MEDS ORDERED DURING VISIT:  New Medications Ordered This Visit   Medications   • cloNIDine (CATAPRES) 0.2 MG tablet     Sig: Take 0.5 tablets by mouth Daily With Breakfast & Lunch AND 1 tablet Every Night.     Dispense:  60 tablet      Refill:  0   • divalproex (Depakote) 250 MG DR tablet     Sig: Take 1 tablet by mouth Every Morning.     Dispense:  30 tablet     Refill:  0   • divalproex (DEPAKOTE) 500 MG DR tablet     Sig: Take 1 tablet by mouth Every Night.     Dispense:  30 tablet     Refill:  0   • hydrOXYzine (ATARAX) 50 MG tablet     Sig: Take 2 tablets by mouth every night at bedtime.     Dispense:  60 tablet     Refill:  0   • melatonin 5 MG tablet tablet     Sig: Take 1 tablet by mouth Every Night.     Dispense:  30 tablet     Refill:  0   • QUEtiapine (SEROquel) 300 MG tablet     Sig: Take 1 tablet by mouth 2 (Two) Times a Day.     Dispense:  60 tablet     Refill:  0   • traZODone (DESYREL) 150 MG tablet     Sig: Take 1 tablet by mouth Every Night.     Dispense:  30 tablet     Refill:  0   • amphetamine-dextroamphetamine (Adderall) 5 MG tablet     Sig: Take 1 tablet by mouth Every Morning.     Dispense:  30 tablet     Refill:  0     No early fills.       Return in about 4 weeks (around 5/25/2021), or if symptoms worsen or fail to improve, for Next scheduled follow up and Recheck.       Progress toward goal: Not at goal    Functional Status: Severe impairment    Prognosis: Guarded with Ongoing Treatment     Treatment plan completed: 4/28/20.            This document has been electronically signed by JOSUE Antoine  April 27, 2021 12:15 EDT    Please note that portions of this note were completed with a voice recognition program. Efforts were made to edit dictation, but occasionally words are mistranscribed.

## 2021-05-05 ENCOUNTER — TELEPHONE (OUTPATIENT)
Dept: PSYCHIATRY | Facility: CLINIC | Age: 21
End: 2021-05-05

## 2021-05-05 DIAGNOSIS — F90.9 HYPERACTIVITY: Primary | ICD-10-CM

## 2021-05-05 RX ORDER — DEXTROAMPHETAMINE SACCHARATE, AMPHETAMINE ASPARTATE MONOHYDRATE, DEXTROAMPHETAMINE SULFATE AND AMPHETAMINE SULFATE 3.75; 3.75; 3.75; 3.75 MG/1; MG/1; MG/1; MG/1
15 CAPSULE, EXTENDED RELEASE ORAL EVERY MORNING
Qty: 30 CAPSULE | Refills: 0 | Status: SHIPPED | OUTPATIENT
Start: 2021-05-05

## 2021-05-05 NOTE — TELEPHONE ENCOUNTER
This APRN just spoke with the patient's nurse, Cindy, at the [facility].  The nurse reports the patient went for a cardiology evaluation and clearance for dental work, and it was found during his encounter that he possibly has a hole in his heart.  The nurse reports the staff who accompanied the patient at the encounter were unsure of the details as they were not medical professionals, but that no further tests were required and the patient does not have to follow-up for 1 year.  The nurses expressed concern regarding the patient being on a stimulant with an unknown heart condition at this time.  This APRN has advised the nurse to hold all stimulants at this time.  The nurse reports she is waiting to speak to the cardiology office regarding the results, and getting copies of the medical record.  This APRN has advised the nurse to please ask the staff to document, and send to the [facility] and this APRN, a statement from the cardiologist regarding safety of the patient continuing with a stimulant or not, and the patient's stimulant will be held until cardiology clearance is received.

## 2021-05-05 NOTE — TELEPHONE ENCOUNTER
"The following message was received by this APRN from the patient's therapist:    \"I was talking with Virginia Pantoja, our assistant cottage  and she expressed these concerns about three clients in [facility].      First off is Shaun Butler.  His new medication, which is Adderall 5 mg that he takes every morning.  It is helping for a few hours which is great, but we were wondering if he could get a second dose around 5:00 PM or so.  He has become wild again in the late afternoon and evening, which worries us and he is staying up all night and keeping all the other boys up at night.  We’re really afraid that if we can’t get his medication worked out while he is still with us here at [facility] that when he goes to an [facility] that they won’t keep him long and will send him to the hospital where he will be sedated and we don’t want to see that happen.  He is a really sweet child and it’s hard to imagine him in such a state due to the fact that his ADHD is not under control.    Thank you,    DESHAWN Chandra, [place] [address]  Office [phone]  Fax [phone]  Toll Free: [phone]  [email or web address]      This APRN has spoken with the nursing staff at the [facility], Cindy, and has discussed the patient with the nurse.  The patient's Adderall 5 mg by mouth once daily in the morning is to be discontinued, and the patient is to start Adderall XR 15 mg by mouth once daily in the morning.  The patient will continue receiving the Adderall 5 mg dosage until the Adderall XR 15 mg dosage is received and this change can be made.  If there are any other further concerns please reach out to this APRN or go to the ER as needed.  "